# Patient Record
Sex: FEMALE | Race: WHITE | NOT HISPANIC OR LATINO | Employment: STUDENT | URBAN - METROPOLITAN AREA
[De-identification: names, ages, dates, MRNs, and addresses within clinical notes are randomized per-mention and may not be internally consistent; named-entity substitution may affect disease eponyms.]

---
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
## 2018-06-19 ENCOUNTER — TREATMENT (OUTPATIENT)
Dept: PHYSICAL THERAPY | Facility: CLINIC | Age: 18
End: 2018-06-19

## 2018-06-19 ENCOUNTER — TRANSCRIBE ORDERS (OUTPATIENT)
Dept: PHYSICAL THERAPY | Facility: CLINIC | Age: 18
End: 2018-06-19

## 2018-06-19 DIAGNOSIS — M85.552: Primary | ICD-10-CM

## 2018-06-19 PROCEDURE — 97161 PT EVAL LOW COMPLEX 20 MIN: CPT | Performed by: PHYSICAL THERAPIST

## 2018-06-19 PROCEDURE — 97110 THERAPEUTIC EXERCISES: CPT | Performed by: PHYSICAL THERAPIST

## 2018-06-19 PROCEDURE — 97140 MANUAL THERAPY 1/> REGIONS: CPT | Performed by: PHYSICAL THERAPIST

## 2018-06-19 NOTE — PROGRESS NOTES
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]  Referring practitioner: Em Cho MD  Date of Initial Visit: 2018  Today's Date: 2018  Patient seen for 1 sessions           Subjective Questionnaire: LEFS 29/80 (37%)    Subjective Evaluation    History of Present Illness  Mechanism of injury: Pt is a 17 y/oWF s/p removal of tumor  Tumor in the L hip jt and it was benign but needed removal and bone graft   Precautions NWB for 6-8 weeks progress 25# lbs   Return to MD  Some pain into the lower leg and it worsens througout the day  Reports low back pain a times during the day    Subjective comment: 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  Patient Occupation: student Quality of life: good    Pain  Current pain ratin  At best pain ratin  At worst pain ratin  Quality: dull ache and sharp  Relieving factors: change in position and support  Aggravating factors: prolonged positioning  Progression: improved    Social Support  Lives in: one-story house    Diagnostic Tests  X-ray: abnormal    Treatments  Current treatment: physical therapy  Patient Goals  Patient goals for therapy: decreased pain, improved balance, increased strength and increased motion  Patient goal: lifting weights, cardio           Objective       Observations   Left Hip  Positive for atrophy and edema.     Additional Observation Details  Quad, glute atrophy    Tenderness     Additional Tenderness Details  Tight IT band L with some referred knee pain   Moderate TTP at glute, TFL, hip adducors  And piriformis    Neurological Testing     Sensation     Hip   Left Hip   Intact: light touch    Right Hip   Intact: light touch    Additional Neurological Details  Reports some occ numbness and tingling down the leg    Active Range of Motion   Left Hip   Flexion: 76 degrees   Extension: 0 degrees    Abduction: 44 degrees   External rotation (90/90): 0 degrees   Internal rotation (90/90): 13 degrees     Right Hip   Normal active range of motion    Additional Active Range of Motion Details  Resting in 13 degrees IR at 90/90 position and grimaces in pain with ER  Lacking 25 degrees hip ext     Passive Range of Motion   Left Hip   Flexion: 80 degrees   Extension: 5 degrees   Abduction: 54 degrees   External rotation (90/90): 0 degrees   Internal rotation (90/90): 13 degrees     Joint Play   Left Hip   Hypomobile in the lateral hip capsule.    Strength/Myotome Testing     Left Hip   Planes of Motion   Flexion: 2+  Extension: 2  Abduction: 2  Adduction: 2+  External rotation: 2  Internal rotation: 2    Right Hip   Normal muscle strength    Tests     Additional Tests Details  No special tests performed due to pain    Ambulation     Observational Gait     Additional Observational Gait Details  NWB L LE with crutches, and safe use  Able to bear 25# today per MD and education on wt bearing but pt only able to place 10# on foot with knee flex in stance and cues needed to keep foot in contact with floor    General Comments     Hip Comments   Girth L thigh 36 cm (10cm above patella) and 47 cm (15 cm above patella)  R thigh 38.5cm and 51 cm           Assessment & Plan     Assessment  Impairments: abnormal gait, abnormal or restricted ROM, activity intolerance, impaired balance, impaired physical strength, lacks appropriate home exercise program, pain with function and weight-bearing intolerance  Assessment details: Pt presents s/p L hip surgery for removal of benign tumor and large incision with crutches and impaired gait.  She has a lot of pain,weakness and ROM loss in the L LE and the MD has ordered 25# of wt/week starting now.  Pt would benefit from skilled PT to address her deficits and help restore normal strength, ROM and function.   Prognosis: good  Functional Limitations: sleeping, walking, uncomfortable because of  pain, standing, stooping and unable to perform repetitive tasks  Goals  Plan Goals: STG ( in 2 weeks)  1) Pt will be able to complete HEP with I and no increase in pain above 3/10  2) Pt will demonstrate step through gait pattern with heel strike and foot flat following wt bearing precautions and crutches with no vc's  3) Pt will demonstrate full hip flex, ext and abd AROM to WNL for progression of functional strength    LTG (in 12 weeks)  1) Pt will demonstrate improved thigh girth to within 3 cm of R LE indicating improved strength  2) Pt will demonstrate good squat and lunge technique with I indicating improved functional strength and movement patterns  3) Pt will report pain less than 2/10 while working out and able to complete a full 30 min workout routine or class  4) Pt will demonstrate normal gait and stairs with reciprocal pattern I'ly      Plan  Therapy options: will be seen for skilled physical therapy services  Planned modality interventions: microcurrent electrical stimulation, thermotherapy (hydrocollator packs), ultrasound, high voltage pulsed current (spasm management), high voltage pulsed current (pain management) and electrical stimulation/Russian stimulation  Planned therapy interventions: manual therapy, neuromuscular re-education, soft tissue mobilization, strengthening, stretching, joint mobilization, home exercise program, gait training, flexibility, abdominal trunk stabilization, balance/weight-bearing training, body mechanics training and functional ROM exercises  Frequency: 2x week  Duration in weeks: 4  Treatment plan discussed with: patient        Manual Therapy:    10     mins  37164;  Therapeutic Exercise:    15     mins  82954;     Neuromuscular Prakash:    0    mins  71553;    Therapeutic Activity:     0     mins  04665;     Gait Training:      10     mins  34620;     Ultrasound:     0     mins  84721;    Electrical Stimulation:    0     mins  33875 ( );  Dry Needling     0      mins self-pay    Timed Treatment:   35   mins   Total Treatment:     49   mins    PT SIGNATURE: Casi Dorado, PT   DATE TREATMENT INITIATED: 6/20/2018    Initial Certification  Certification Period: 9/18/2018  I certify that the therapy services are furnished while this patient is under my care.  The services outlined above are required by this patient, and will be reviewed every 90 days.     PHYSICIAN: Em Cho MD      DATE:     Please sign and return via fax to [phone].. Thank you, [facility].

## 2018-06-28 ENCOUNTER — TREATMENT (OUTPATIENT)
Dept: PHYSICAL THERAPY | Facility: CLINIC | Age: 18
End: 2018-06-28

## 2018-06-28 DIAGNOSIS — M85.552: Primary | ICD-10-CM

## 2018-06-28 PROCEDURE — 97110 THERAPEUTIC EXERCISES: CPT | Performed by: PHYSICAL THERAPIST

## 2018-06-28 PROCEDURE — G0283 ELEC STIM OTHER THAN WOUND: HCPCS | Performed by: PHYSICAL THERAPIST

## 2018-06-28 PROCEDURE — 97140 MANUAL THERAPY 1/> REGIONS: CPT | Performed by: PHYSICAL THERAPIST

## 2018-06-28 NOTE — PROGRESS NOTES
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  Forgets to put weight on the leg    Objective   See Exercise, Manual, and Modality Logs for complete treatment. HEP handouts given for new ex  Assessment/Plan  Gait needs        Manual Therapy:    13     mins  67356;  Therapeutic Exercise:    27     mins  24743;     Neuromuscular Prakash:    0    mins  99288;    Therapeutic Activity:     0     mins  74949;     Gait Trainin     mins  32532;     Ultrasound:     0     mins  68791;    Electrical Stimulation:    15     mins  34866 ( );  Dry Needling     0     mins self-pay    Timed Treatment:   47   mins   Total Treatment:     63   mins    Casi Dorado, PT  Physical Therapist

## 2018-07-03 ENCOUNTER — TREATMENT (OUTPATIENT)
Dept: PHYSICAL THERAPY | Facility: CLINIC | Age: 18
End: 2018-07-03

## 2018-07-03 DIAGNOSIS — M85.552: Primary | ICD-10-CM

## 2018-07-03 PROCEDURE — 97140 MANUAL THERAPY 1/> REGIONS: CPT | Performed by: PHYSICAL THERAPIST

## 2018-07-03 PROCEDURE — 97110 THERAPEUTIC EXERCISES: CPT | Performed by: PHYSICAL THERAPIST

## 2018-07-03 PROCEDURE — G0283 ELEC STIM OTHER THAN WOUND: HCPCS | Performed by: PHYSICAL THERAPIST

## 2018-07-03 NOTE — PROGRESS NOTES
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 2/10 pain max. Pain 0/10 at times.     Objective   See Exercise, Manual, and Modality Logs for complete treatment. 50# wt bearing added today and pt tolerated well so her HEP will include amb with 50# L wt bearing.       Assessment/Plan  Gait pattern is much improved with near full knee ext in stance and increased stance phase.       Manual Therapy:    13     mins  40254;  Therapeutic Exercise:    28     mins  53086;     Neuromuscular Prakash:    0    mins  14325;    Therapeutic Activity:     0     mins  04545;     Gait Trainin     mins  83101;     Ultrasound:     0     mins  09672;    Electrical Stimulation:    15     mins  11166 ( );  Dry Needling     0     mins self-pay    Timed Treatment:   41   mins   Total Treatment:     60   mins    Casi Dorado PT  Physical Therapist

## 2018-07-05 ENCOUNTER — TREATMENT (OUTPATIENT)
Dept: PHYSICAL THERAPY | Facility: CLINIC | Age: 18
End: 2018-07-05

## 2018-07-05 DIAGNOSIS — M85.552: Primary | ICD-10-CM

## 2018-07-05 PROCEDURE — 97140 MANUAL THERAPY 1/> REGIONS: CPT | Performed by: PHYSICAL THERAPIST

## 2018-07-05 PROCEDURE — G0283 ELEC STIM OTHER THAN WOUND: HCPCS | Performed by: PHYSICAL THERAPIST

## 2018-07-05 PROCEDURE — 97110 THERAPEUTIC EXERCISES: CPT | Performed by: PHYSICAL THERAPIST

## 2018-07-05 NOTE — PROGRESS NOTES
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  Mostly just discomfort now.     Objective   See Exercise, Manual, and Modality Logs for complete treatment.       Assessment/Plan  Pt ambulation with crutches continues to improved and will progress POC for strength.  L hip ER is very limited so need MD clarification on how much to progress this.       Manual Therapy:    13     mins  03179;  Therapeutic Exercise:    29     mins  60688;     Neuromuscular Prakash:    0    mins  31227;    Therapeutic Activity:     0     mins  94247;     Gait Trainin     mins  18264;     Ultrasound:     0     mins  40932;    Electrical Stimulation:    15     mins  37470 ( );  Dry Needling     0     mins self-pay    Timed Treatment:   42   mins   Total Treatment:     62   mins    Casi Dorado, PT  Physical Therapist

## 2018-07-10 ENCOUNTER — TREATMENT (OUTPATIENT)
Dept: PHYSICAL THERAPY | Facility: CLINIC | Age: 18
End: 2018-07-10

## 2018-07-10 DIAGNOSIS — M85.552: Primary | ICD-10-CM

## 2018-07-10 PROCEDURE — 97116 GAIT TRAINING THERAPY: CPT | Performed by: PHYSICAL THERAPIST

## 2018-07-10 PROCEDURE — 97110 THERAPEUTIC EXERCISES: CPT | Performed by: PHYSICAL THERAPIST

## 2018-07-10 PROCEDURE — G0283 ELEC STIM OTHER THAN WOUND: HCPCS | Performed by: PHYSICAL THERAPIST

## 2018-07-10 PROCEDURE — 97140 MANUAL THERAPY 1/> REGIONS: CPT | Performed by: PHYSICAL THERAPIST

## 2018-07-10 NOTE — PROGRESS NOTES
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   Called surgeon again today to clarify MD orders and left VM. Decreased step length R due to hip abd weakness and hip flexor tightness L    Assessment/Plan  Pt progressing well with amb in parallel bars and following wt bearing. Thigh atrophy of quad and HS severe so increased standing activity will help hypertrophy of muscle.  Very weak in L hip abd  Standing hip flexor stretch since tightness observed during gait.    Manual Therapy:    10     mins  01750;  Therapeutic Exercise:    20     mins  19024;     Neuromuscular Prakash:    0    mins  49169;    Therapeutic Activity:     0     mins  79809;     Gait Trainin     mins  70008;     Ultrasound:     0     mins  86766;    Electrical Stimulation:    15     mins  74864 ( );  Dry Needling     0     mins self-pay    Timed Treatment:   43   mins   Total Treatment:     62   mins    Casi Dorado, PT  Physical Therapist

## 2018-07-12 ENCOUNTER — TREATMENT (OUTPATIENT)
Dept: PHYSICAL THERAPY | Facility: CLINIC | Age: 18
End: 2018-07-12

## 2018-07-12 PROCEDURE — 97116 GAIT TRAINING THERAPY: CPT | Performed by: PHYSICAL THERAPIST

## 2018-07-12 PROCEDURE — 97110 THERAPEUTIC EXERCISES: CPT | Performed by: PHYSICAL THERAPIST

## 2018-07-12 PROCEDURE — G0283 ELEC STIM OTHER THAN WOUND: HCPCS | Performed by: PHYSICAL THERAPIST

## 2018-07-12 PROCEDURE — 97140 MANUAL THERAPY 1/> REGIONS: CPT | Performed by: PHYSICAL THERAPIST

## 2018-07-12 NOTE — PROGRESS NOTES
Physical Therapy Daily Progress Note        Patient: Bruna Garcia   : 2000  Diagnosis/ICD-10 Code:  No primary diagnosis found.  Referring practitioner: Em Cho MD  Date of Initial Visit: No linked episodes  Today's Date: 2018  Patient seen for Visit count could not be calculated. Make sure you are using a visit which is associated with an episode. sessions             Subjective Pt states she has been practicing walking at home and it has been feeling good    Objective   See Exercise, Manual, and Modality Logs for complete treatment.       Assessment/Plan Pt hip external rotation still lacking so mobs with belt added today. Gait mechanics need a lot of cueing since pelvis retracting and very little quad control.    Manual Therapy:    17     mins  89897;  Therapeutic Exercise:    21     mins  32306;     Neuromuscular Prakash:    0    mins  80584;    Therapeutic Activity:     0     mins  13526;     Gait Trainin     mins  65651;     Ultrasound:     0     mins  24503;    Electrical Stimulation:    15     mins  07689 ( );  Dry Needling     0     mins self-pay    Timed Treatment:   49   mins   Total Treatment:     70   mins    Casi Dorado PT  Physical Therapist

## 2018-07-16 ENCOUNTER — OFFICE VISIT (OUTPATIENT)
Dept: OBSTETRICS AND GYNECOLOGY | Facility: CLINIC | Age: 18
End: 2018-07-16

## 2018-07-16 VITALS
BODY MASS INDEX: 19.12 KG/M2 | DIASTOLIC BLOOD PRESSURE: 66 MMHG | SYSTOLIC BLOOD PRESSURE: 100 MMHG | HEIGHT: 64 IN | WEIGHT: 112 LBS | HEART RATE: 72 BPM

## 2018-07-16 DIAGNOSIS — N76.0 ACUTE VAGINITIS: Primary | ICD-10-CM

## 2018-07-16 PROCEDURE — 99213 OFFICE O/P EST LOW 20 MIN: CPT | Performed by: OBSTETRICS & GYNECOLOGY

## 2018-07-16 RX ORDER — METRONIDAZOLE 500 MG/1
500 TABLET ORAL 2 TIMES DAILY
Qty: 14 TABLET | Refills: 0 | Status: SHIPPED | OUTPATIENT
Start: 2018-07-16 | End: 2018-07-23

## 2018-07-16 NOTE — PROGRESS NOTES
"Mati Garcia is a 18 y.o. female   Chief Complaint   Patient presents with   • Annual Exam     patient reports vaginal ordor, no vaginal itching or burning      History of Present Illness   Reports one week of vaginal odor.  Smells fishy.  Positive itching.  Not significant discharge.  Denies any fever or chills, no new sexual partners.  She has not been sexually active since her last visit us.  She underwent surgery in April on her left hip and was on some antibiotics around the time of that.    History reviewed. No pertinent past medical history.  Past Surgical History:   Procedure Laterality Date   • HIP SURGERY Left      Social History   Substance Use Topics   • Smoking status: Former Smoker     Types: Cigarettes   • Smokeless tobacco: Never Used   • Alcohol use Yes      Comment: social     Family History   Problem Relation Age of Onset   • No Known Problems Sister    • Breast cancer Maternal Grandmother    • Cancer Maternal Grandfather    • No Known Problems Sister    • No Known Problems Sister    • Ovarian cancer Neg Hx    • Uterine cancer Neg Hx    • Colon cancer Neg Hx          Review of Systems   Constitutional: Negative for activity change, appetite change, fatigue, fever and unexpected weight change.   Gastrointestinal: Negative for abdominal pain, nausea and vomiting.   Genitourinary: Positive for vaginal discharge. Negative for menstrual problem, vaginal bleeding and vaginal pain.   Hematological: Does not bruise/bleed easily.   Psychiatric/Behavioral: Negative for agitation.       Objective    /66   Pulse 72   Ht 162.6 cm (64.02\")   Wt 50.8 kg (112 lb)   LMP 07/12/2018 (Exact Date)   BMI 19.22 kg/m²    Physical Exam   Constitutional: She is oriented to person, place, and time. She appears well-developed and well-nourished.   HENT:   Head: Normocephalic and atraumatic.   Eyes: EOM are normal. No scleral icterus.   Neck: Normal range of motion.   Cardiovascular: Normal rate.  "   Pulmonary/Chest: Effort normal. No respiratory distress.   Abdominal: Soft.   Genitourinary: Uterus is not deviated, not enlarged and not tender. Cervix exhibits no motion tenderness. Right adnexum displays no mass, no tenderness and no fullness. Left adnexum displays no mass, no tenderness and no fullness. No bleeding in the vagina. No foreign body in the vagina. Vaginal discharge found.   Neurological: She is alert and oriented to person, place, and time.   Skin: Skin is warm and dry.   Psychiatric: She has a normal mood and affect. Her behavior is normal.   + odor  Wet prep: Clue cells present, no yeast or trichomonas      Assessment/Plan   Problems Addressed this Visit     None      Visit Diagnoses     Acute vaginitis    -  Primary        BV per wet prep and + odor  Will treat with Flagyl.  She has tried to Metrogel before and prefers pills. Side effects reviewed.

## 2018-07-17 ENCOUNTER — TREATMENT (OUTPATIENT)
Dept: PHYSICAL THERAPY | Facility: CLINIC | Age: 18
End: 2018-07-17

## 2018-07-17 PROCEDURE — 97110 THERAPEUTIC EXERCISES: CPT | Performed by: PHYSICAL THERAPIST

## 2018-07-17 PROCEDURE — 97140 MANUAL THERAPY 1/> REGIONS: CPT | Performed by: PHYSICAL THERAPIST

## 2018-07-17 PROCEDURE — G0283 ELEC STIM OTHER THAN WOUND: HCPCS | Performed by: PHYSICAL THERAPIST

## 2018-07-17 PROCEDURE — 97116 GAIT TRAINING THERAPY: CPT | Performed by: PHYSICAL THERAPIST

## 2018-07-17 NOTE — PROGRESS NOTES
Physical Therapy Daily Progress Note  Patient: Bruna Garcia   : 2000  Diagnosis/ICD-10 Code:  No primary diagnosis found.  Referring practitioner: Em Cho MD  Date of Initial Visit: Type: THERAPY  Noted: 2018  Today's Date: 2018  Patient seen for 6 sessions            Subjective Pt states she is now driving and feeling good    Objective   See Exercise, Manual, and Modality Logs for complete treatment.       Assessment/Plan Gait speed improved and pt activating quads during stance phase so wt shift in mirror with minimal UE support added today.  Tolerated HS with tband prone today.      Manual Therapy:   14      mins  55602;  Therapeutic Exercise:    20     mins  34983;     Neuromuscular Prakash:    0    mins  85569;    Therapeutic Activity:     0     mins  81538;     Gait Trainin     mins  76061;     Ultrasound:     0     mins  32507;    Electrical Stimulation:    15     mins  66912 ( );  Dry Needling     0     mins self-pay    Timed Treatment:   46   mins   Total Treatment:     64   mins    Casi Dorado, PT  Physical Therapist

## 2018-07-19 ENCOUNTER — TREATMENT (OUTPATIENT)
Dept: PHYSICAL THERAPY | Facility: CLINIC | Age: 18
End: 2018-07-19

## 2018-07-19 PROCEDURE — 97110 THERAPEUTIC EXERCISES: CPT | Performed by: PHYSICAL THERAPIST

## 2018-07-19 PROCEDURE — G0283 ELEC STIM OTHER THAN WOUND: HCPCS | Performed by: PHYSICAL THERAPIST

## 2018-07-19 PROCEDURE — 97140 MANUAL THERAPY 1/> REGIONS: CPT | Performed by: PHYSICAL THERAPIST

## 2018-07-19 NOTE — PROGRESS NOTES
Physical Therapy Daily Progress Note        Patient: Bruna Garcia   : 2000  Diagnosis/ICD-10 Code:  No primary diagnosis found.  Referring practitioner: Em Cho MD  Date of Initial Visit: Type: THERAPY  Noted: 2018  Today's Date: 2018  Patient seen for 7 sessions             Subjective Pt states she is feeling good no pain    Objective   See Exercise, Manual, and Modality Logs for complete treatment.       Assessment/Plan Pt tolerated wt bearing progression today to 90lbs using scale and tolerated more hip ER during stretches today.           Manual Therapy:   14      mins  37634;  Therapeutic Exercise:    40     mins  57472;     Neuromuscular Prakash:    0    mins  12629;    Therapeutic Activity:     0     mins  14924;     Gait Trainin     mins  40036;     Ultrasound:     0     mins  72006;    Electrical Stimulation:    15     mins  40761 ( );  Dry Needling     0     mins self-pay    Timed Treatment:   54   mins   Total Treatment:     70   mins    Casi Dorado, PT  Physical Therapist

## 2018-07-24 ENCOUNTER — TREATMENT (OUTPATIENT)
Dept: PHYSICAL THERAPY | Facility: CLINIC | Age: 18
End: 2018-07-24

## 2018-07-24 DIAGNOSIS — M85.552: Primary | ICD-10-CM

## 2018-07-24 PROCEDURE — 97140 MANUAL THERAPY 1/> REGIONS: CPT | Performed by: PHYSICAL THERAPIST

## 2018-07-24 PROCEDURE — G0283 ELEC STIM OTHER THAN WOUND: HCPCS | Performed by: PHYSICAL THERAPIST

## 2018-07-24 PROCEDURE — 97110 THERAPEUTIC EXERCISES: CPT | Performed by: PHYSICAL THERAPIST

## 2018-07-24 NOTE — PROGRESS NOTES
Physical Therapy Daily Progress Note      Patient: Bruna Garcia   : 2000  Diagnosis/ICD-10 Code:  No primary diagnosis found.  Referring practitioner: Em Cho MD  Date of Initial Visit: Type: THERAPY  Noted: 2018  Today's Date: 2018  Patient seen for 8 sessions           Subjective Pt reports she has been using one crutch most of the time    Objective   See Exercise, Manual, and Modality Logs for complete treatment.       Assessment/Plan  ER ROM is still bery limited and stretching causes pain so I will likely need to call MD to see how hard I can push her as stretches have been light due to hardware       Manual Therapy:    12     mins  11518;  Therapeutic Exercise:    29     mins  02835;     Neuromuscular Prakash:    0    mins  94335;    Therapeutic Activity:     0     mins  00780;     Gait Trainin     mins  74009;     Ultrasound:     0     mins  88595;    Electrical Stimulation:    15     mins  35697 ( );  Dry Needling     0     mins self-pay    Timed Treatment:   41   mins   Total Treatment:     62   mins    Casi Dorado PT  Physical Therapist

## 2018-07-26 ENCOUNTER — TREATMENT (OUTPATIENT)
Dept: PHYSICAL THERAPY | Facility: CLINIC | Age: 18
End: 2018-07-26

## 2018-07-26 DIAGNOSIS — M85.552: Primary | ICD-10-CM

## 2018-07-26 PROCEDURE — G0283 ELEC STIM OTHER THAN WOUND: HCPCS | Performed by: PHYSICAL THERAPIST

## 2018-07-26 PROCEDURE — 97140 MANUAL THERAPY 1/> REGIONS: CPT | Performed by: PHYSICAL THERAPIST

## 2018-07-26 PROCEDURE — 97110 THERAPEUTIC EXERCISES: CPT | Performed by: PHYSICAL THERAPIST

## 2018-07-26 NOTE — PROGRESS NOTES
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       Assessment/Plan Pt quad appears to be marybel in stance phase but hip is not stable enough to prevent hip IR in stance and step length short due to hip flexor tightness. No band with standing hip abd and ext today due to increased soreness    Manual Therapy:    23     mins  41168;  Therapeutic Exercise:    33     mins  03566;     Neuromuscular Prakash:    0    mins  95159;    Therapeutic Activity:     0     mins  39554;     Gait Trainin     mins  60162;     Ultrasound:     0     mins  32429;    Electrical Stimulation:    15     mins  22633 ( );  Dry Needling     0     mins self-pay    Timed Treatment:   56   mins   Total Treatment:     72   mins    Casi Dorado PT  Physical Therapist

## 2018-07-31 ENCOUNTER — TREATMENT (OUTPATIENT)
Dept: PHYSICAL THERAPY | Facility: CLINIC | Age: 18
End: 2018-07-31

## 2018-07-31 PROCEDURE — 97140 MANUAL THERAPY 1/> REGIONS: CPT | Performed by: PHYSICAL THERAPIST

## 2018-07-31 PROCEDURE — 97110 THERAPEUTIC EXERCISES: CPT | Performed by: PHYSICAL THERAPIST

## 2018-07-31 NOTE — PROGRESS NOTES
Physical Therapy Daily Progress Note        Patient: Bruna Garcia   : 2000  Diagnosis/ICD-10 Code:  No primary diagnosis found.  Referring practitioner: Em Cho MD  Date of Initial Visit: Type: THERAPY  Noted: 2018  Today's Date: 2018  Patient seen for 10 sessions           Subjective Pt reports knee felt better with tape and after last treatment.    Objective   See Exercise, Manual, and Modality Logs for complete treatment.       Assessment/Plan Gait trng without parallel bars and pelvic facilitation to improve hip abd activation and this improved gait quality.  Pre gait in mirror given as HEP to improved this.     Manual Therapy:    15     mins  35429;  Therapeutic Exercise:    39     mins  65210;     Neuromuscular Prakash:    0    mins  15808;    Therapeutic Activity:     0     mins  94693;     Gait Trainin     mins  00635;     Ultrasound:     0     mins  13198;    Electrical Stimulation:    0     mins  75914 ( );  Dry Needling     0     mins self-pay    Timed Treatment:   54   mins   Total Treatment:     58   mins    Casi Dorado PT  Physical Therapist

## 2018-08-06 ENCOUNTER — OFFICE VISIT (OUTPATIENT)
Dept: PHYSICAL THERAPY | Facility: CLINIC | Age: 18
End: 2018-08-06

## 2018-08-06 DIAGNOSIS — M85.552: Primary | ICD-10-CM

## 2018-08-06 PROCEDURE — 97032 APPL MODALITY 1+ESTIM EA 15: CPT | Performed by: PHYSICAL THERAPIST

## 2018-08-06 PROCEDURE — 97530 THERAPEUTIC ACTIVITIES: CPT | Performed by: PHYSICAL THERAPIST

## 2018-08-06 PROCEDURE — 97110 THERAPEUTIC EXERCISES: CPT | Performed by: PHYSICAL THERAPIST

## 2018-08-06 PROCEDURE — 97116 GAIT TRAINING THERAPY: CPT | Performed by: PHYSICAL THERAPIST

## 2018-08-08 ENCOUNTER — OFFICE VISIT (OUTPATIENT)
Dept: PHYSICAL THERAPY | Facility: CLINIC | Age: 18
End: 2018-08-08

## 2018-08-08 DIAGNOSIS — M85.552: Primary | ICD-10-CM

## 2018-08-08 PROCEDURE — 97032 APPL MODALITY 1+ESTIM EA 15: CPT | Performed by: PHYSICAL THERAPIST

## 2018-08-08 PROCEDURE — 97530 THERAPEUTIC ACTIVITIES: CPT | Performed by: PHYSICAL THERAPIST

## 2018-08-08 PROCEDURE — 97110 THERAPEUTIC EXERCISES: CPT | Performed by: PHYSICAL THERAPIST

## 2018-08-08 NOTE — PROGRESS NOTES
Physical Therapy Daily Progress Note    Time In 1600  Time Out 1718    Bruna Garcia reports: low back and right hip felt better from new stretches(LTR, skc, piriformis    Subjective     Objective   -amb in/out of clinic with one crutch with noted proper trunk posture.    See Exercise, Manual, and Modality Logs for complete treatment.   Added:  Treadmill fwd 3 min, 2 min each sidestep, 3 min retro  Cable walks 4 way x 5 reps with 5 lbs  Pallof press with red t band  Supine B LE hip abd/er with green band    Assessment/Plan  Able to increase exercise and functional activity without increased symptoms  Still with noted left hip, quad and glut fatigue with prolonged activity/prolonged duration of task.  Compliant/cooperative and motivated with rehab efforts.    Progress strengthening /stabilization /functional activity  L hip.             Manual Therapy:         mins  73884;  Therapeutic Exercise:    35     mins  06720;     Neuromuscular Prakash:        mins  57977;    Therapeutic Activity:     15     mins  38419;     Gait Training:           mins  79298;     Ultrasound:        mins  81273;    Electrical Stimulation:   15      mins  90740 ( );      Timed Treatment: 50   mins   Total Treatment:     78   mins    Carlos Duke, PTA    Physical Therapist Assistant [place] [facility]

## 2018-08-09 NOTE — PROGRESS NOTES
Physical Therapy Daily Progress Note    Time In  1500 Time Out 1630    Bruna Garcia reports: hip is feeling better.  Still feels weak but not as painful as before.  Noting some right sided low back discomfort.    Subjective     Objective     Ambulates with two crutches. Observed crutches to be to short causing slight fwd flexed posture which may be contributing to LBP.    See Exercise, Manual, and Modality Logs for complete treatment.     Exercise rationale/ pain free exercise performance  Anatomy and structure of affected musculature  Posture/Postural awareness with crutches  Sleeping positions with pillows  Alternate exercise positions  Verbal/Tactile cues to ensure correct exercise performance/technique    Added:4 way SLR L LE x 10 each; piriformis stretch B LE; SKTC B LE and LTR as well as standing hip marches B LE    Gait training x 10 min:  Adjusted crutch up 1 notch to facilitate more proper trunk posture/positioning.  Instructed and performed one crutch ambulation up and down 60 foot walking track x 4 times with FWB.  Ambulated up and down stair module with one crutch and one rail.        Assessment/Plan  Presents with improved left LE weightbearing capability and decreased reliance on assistive device as evidenced by capability to go to one crutch ambulation comfortably.  Still with evident weakness of left hip and quad musculature and overall decreased exercise and functional activity capability especially in regards to prolonged duration.    Progress strengthening /stabilization /functional activity for affected musculature           Manual Therapy:         mins  23464;  Therapeutic Exercise:  10     mins  24883;     Neuromuscular Prakash:        mins  71409;    Therapeutic Activity:    20      mins  78724;     Gait Training:   10      mins  10856;     Ultrasound:        mins  36763;    Electrical Stimulation:    15     mins  45549 ( );      Timed Treatment: 40   mins   Total Treatment:    90     kamini Duke, hospitals    Physical Therapist Assistant [place] [address]

## 2018-08-10 ENCOUNTER — OFFICE VISIT (OUTPATIENT)
Dept: PHYSICAL THERAPY | Facility: CLINIC | Age: 18
End: 2018-08-10

## 2018-08-10 DIAGNOSIS — M85.552: Primary | ICD-10-CM

## 2018-08-10 PROCEDURE — 97110 THERAPEUTIC EXERCISES: CPT | Performed by: PHYSICAL THERAPIST

## 2018-08-10 PROCEDURE — 97530 THERAPEUTIC ACTIVITIES: CPT | Performed by: PHYSICAL THERAPIST

## 2018-08-10 NOTE — PROGRESS NOTES
Physical Therapy Daily Progress Note    Time In 1400  Time [id number]    Bruna Garcia reports: hip did well with activity the other day, only soreness.  Going on vacation next week(week 8/13) for 10 days to beach    Subjective     Objective   See Exercise, Manual, and Modality Logs for complete treatment.   Added:  Mini band side steps, fwd steps x 10 each  4 inchFwd and lateral step ups fwd with knee drivers x 10 each  Low 5 inch hurdles and 10 inch hurdles x 10 each  Standing Toe tap on foam roller  X 10 each  sidelyng clams with blue t band  Wobble board fwd/bwd and side/side x 3 min each    Assessment/Plan  Continues to make good progress with exercise advancement and increased weightbearing balance and strengthening activities.  Ambulates on treadmill with good pace and foot placement.    Other  Reassess upon return from vacation.           Manual Therapy:         mins  23760;  Therapeutic Exercise:    27     mins  43381;     Neuromuscular Prakash:        mins  79119;    Therapeutic Activity:    18     mins  48570;     Gait Training:           mins  58092;     Ultrasound:        mins  88909;    Electrical Stimulation:         mins  22086 ( );      Timed Treatment: 45    mins   Total Treatment:     50   mins    Carlos Duke PTA    Physical Therapist Assistant [place] [address]

## 2018-08-21 ENCOUNTER — TREATMENT (OUTPATIENT)
Dept: PHYSICAL THERAPY | Facility: CLINIC | Age: 18
End: 2018-08-21

## 2018-08-21 DIAGNOSIS — M85.552: Primary | ICD-10-CM

## 2018-08-21 PROCEDURE — 97110 THERAPEUTIC EXERCISES: CPT | Performed by: PHYSICAL THERAPIST

## 2018-08-21 PROCEDURE — 97140 MANUAL THERAPY 1/> REGIONS: CPT | Performed by: PHYSICAL THERAPIST

## 2018-08-21 NOTE — PROGRESS NOTES
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    Objective   See Exercise, Manual, and Modality Logs for complete treatment.       Assessment/Plan  Single leg stance activities added today since L hip abductors weak as evidenced by lateral trunk lean in stance L.       Manual Therapy:    14     mins  47073;  Therapeutic Exercise:    39     mins  77199;     Neuromuscular Prakash:    0    mins  82806;    Therapeutic Activity:     0     mins  16156;     Gait Trainin     mins  69838;     Ultrasound:     0     mins  38524;    Electrical Stimulation:    0     mins  48233 ( );  Dry Needling     0     mins self-pay    Timed Treatment:   53   mins   Total Treatment:     54   mins    Casi Dorado PT  Physical Therapist

## 2018-08-23 ENCOUNTER — TREATMENT (OUTPATIENT)
Dept: PHYSICAL THERAPY | Facility: CLINIC | Age: 18
End: 2018-08-23

## 2018-08-23 PROCEDURE — 97140 MANUAL THERAPY 1/> REGIONS: CPT | Performed by: PHYSICAL THERAPIST

## 2018-08-23 PROCEDURE — 97110 THERAPEUTIC EXERCISES: CPT | Performed by: PHYSICAL THERAPIST

## 2018-08-23 NOTE — PROGRESS NOTES
Physical Therapy Daily Progress Note        Patient: Bruna Garcia   : 2000  Diagnosis/ICD-10 Code:  No primary diagnosis found.  Referring practitioner: Em Cho MD  Date of Initial Visit: Type: THERAPY  Noted: 2018  Today's Date: 2018  Patient seen for 15 sessions               Subjective Pt reports her soreness is gone and no pain today.     Objective   See Exercise, Manual, and Modality Logs for complete treatment. L hip ER at 90 degrees hip flex measured at 35 degrees today.      Assessment/Plan  L hip ER improved but ROM, pelvic mobility and hip abd/glute strength still lacking so giat abnormal .  Continued PT recommended              Manual Therapy:    12     mins  42998;  Therapeutic Exercise:    32     mins  90192;     Neuromuscular Prakahs:    0    mins  93552;    Therapeutic Activity:     0     mins  85827;     Gait Trainin     mins  32816;     Ultrasound:     0     mins  32597;    Electrical Stimulation:    0     mins  50350 ( );  Dry Needling     0     mins self-pay    Timed Treatment:   44   mins   Total Treatment:     48   mins    Casi Dorado PT  Physical Therapist

## 2018-08-27 ENCOUNTER — TREATMENT (OUTPATIENT)
Dept: PHYSICAL THERAPY | Facility: CLINIC | Age: 18
End: 2018-08-27

## 2018-08-27 DIAGNOSIS — M85.552: Primary | ICD-10-CM

## 2018-08-27 PROCEDURE — G0283 ELEC STIM OTHER THAN WOUND: HCPCS | Performed by: PHYSICAL THERAPIST

## 2018-08-27 PROCEDURE — 97140 MANUAL THERAPY 1/> REGIONS: CPT | Performed by: PHYSICAL THERAPIST

## 2018-08-27 PROCEDURE — 97110 THERAPEUTIC EXERCISES: CPT | Performed by: PHYSICAL THERAPIST

## 2018-08-27 NOTE — PROGRESS NOTES
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    Objective   See Exercise, Manual, and Modality Logs for complete treatment. ER at 90 45 degrees.      Assessment/Plan   Pt progressing well and ER at 90 degrees improved by 10 degrees.  Gait deficits with glute weakness and hip ER limited.        Manual Therapy:    15     mins  64364;  Therapeutic Exercise:    38     mins  39717;     Neuromuscular Prakash:    0    mins  18480;    Therapeutic Activity:     0     mins  13043;     Gait Trainin     mins  43775;     Ultrasound:     0     mins  84760;    Electrical Stimulation:    15     mins  16764 ( );  Dry Needling     0     mins self-pay    Timed Treatment:   53   mins   Total Treatment:     75   mins    Casi Dorado, PT  Physical Therapist

## 2018-09-12 ENCOUNTER — TREATMENT (OUTPATIENT)
Dept: PHYSICAL THERAPY | Facility: CLINIC | Age: 18
End: 2018-09-12

## 2018-09-12 DIAGNOSIS — M85.552: Primary | ICD-10-CM

## 2018-09-12 PROCEDURE — 97110 THERAPEUTIC EXERCISES: CPT | Performed by: PHYSICAL THERAPIST

## 2018-09-12 PROCEDURE — 97140 MANUAL THERAPY 1/> REGIONS: CPT | Performed by: PHYSICAL THERAPIST

## 2018-09-12 PROCEDURE — 97530 THERAPEUTIC ACTIVITIES: CPT | Performed by: PHYSICAL THERAPIST

## 2018-09-12 PROCEDURE — 97116 GAIT TRAINING THERAPY: CPT | Performed by: PHYSICAL THERAPIST

## 2018-09-12 NOTE — PROGRESS NOTES
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  She has been having back pain and its a constant pain.    Objective   See Exercise, Manual, and Modality Logs for complete treatment. Palpation revealed L rotation at T12-L2 and guarding at R paraspinals.  She has moderate tenderness at this area as well as a low PSIS and high ASIS on the L.  Muscle energy performed at L innominate to rotate anteriorly. L hip ER with hip flexed at 90 degrees 46 degrees. L hip ext strength 4/5 and abd, IR and ER strength 4-/5.  Gait presents with R trunk shift with L lateral trunk lean in stance L, hip retraction in stance L and genurecurvatum. L hip capsule hypomobile laterally, ant and post.    Assessment/Plan  Muscle energy did help to level the pelvis and rotational mob at L2 produced cavitation.  Gait deficits indicate hip and knee strength that is not adequate to allow normal gait and her hip strength and ROM both play into this. Mobs for ant hip will be added and ER ROM will need to be progressed as well and closed chain ex to help her return to normal gait.        Manual Therapy:    16     mins  78833;  Therapeutic Exercise:    13     mins  65489;     Neuromuscular Prakash:    0    mins  85488;    Therapeutic Activity:     13     mins  88487;     Gait Trainin     mins  94010;     Ultrasound:     0     mins  64361;    Electrical Stimulation:    0     mins  71560 ( );  Dry Needling     0     mins self-pay    Timed Treatment:   51   mins   Total Treatment:     51   mins    Casi Dorado, PT  Physical Therapist

## 2018-09-14 ENCOUNTER — TREATMENT (OUTPATIENT)
Dept: PHYSICAL THERAPY | Facility: CLINIC | Age: 18
End: 2018-09-14

## 2018-09-14 PROCEDURE — 97530 THERAPEUTIC ACTIVITIES: CPT | Performed by: PHYSICAL THERAPIST

## 2018-09-14 PROCEDURE — 97140 MANUAL THERAPY 1/> REGIONS: CPT | Performed by: PHYSICAL THERAPIST

## 2018-09-14 PROCEDURE — 97110 THERAPEUTIC EXERCISES: CPT | Performed by: PHYSICAL THERAPIST

## 2018-09-14 PROCEDURE — G0283 ELEC STIM OTHER THAN WOUND: HCPCS | Performed by: PHYSICAL THERAPIST

## 2018-09-14 NOTE — PROGRESS NOTES
Physical Therapy Daily Progress Note        Patient: Bruna Garcia   : 2000  Diagnosis/ICD-10 Code:  No primary diagnosis found.  Referring practitioner: Van Gonzales*  Date of Initial Visit: Type: THERAPY  Noted: 2018  Today's Date: 2018  Patient seen for 18 sessions             Subjective Pt reports back pain continues and was severe yesterday morning.      Objective   See Exercise, Manual, and Modality Logs for complete treatment. Review of crutch walking with reciprocal four point gait.        Assessment/Plan New ex and mobs added for L hip flexibility and strength. Gait improved with pre gait as manual facilitation at pelvis and trunk added to re-educate her pattern.        Manual Therapy:    18     mins  81667;  Therapeutic Exercise:    12     mins  47082;     Neuromuscular Prakash:    0    mins  24423;    Therapeutic Activity:     15     mins  52774;     Gait Trainin     mins  98005;     Ultrasound:     0     mins  90721;    Electrical Stimulation:    15     mins  44545 ( );  Dry Needling     0     mins self-pay    Timed Treatment:   45   mins   Total Treatment:     60   mins    Casi Dorado, PT  Physical Therapist

## 2018-09-17 ENCOUNTER — TREATMENT (OUTPATIENT)
Dept: PHYSICAL THERAPY | Facility: CLINIC | Age: 18
End: 2018-09-17

## 2018-09-17 DIAGNOSIS — M85.552: Primary | ICD-10-CM

## 2018-09-17 PROCEDURE — PTSPMIN1 PR PHYS THER SP UP TO 15 MINUTES: Performed by: PHYSICAL THERAPIST

## 2018-09-17 NOTE — PROGRESS NOTES
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      Objective   See Exercise, Manual, and Modality Logs for complete treatment.       Assessment/Plan Hip mobs and gait trng emphasized today and pt needed review of new exercises.  Pt continues to not attain quad and glute contraction in standing on L unless cued.  Gait deficits still significant.      Manual Therapy:    15     mins  21191;  Therapeutic Exercise:    0   mins  84153;     Neuromuscular Prakash:    0    mins  95049;    Therapeutic Activity:     15     mins  96050;     Gait Trainin     mins  69464;     Ultrasound:     0     mins  79718;    Electrical Stimulation:    0     mins  98776 ( );  Dry Needling     0     mins self-pay    Timed Treatment:   35   mins   Total Treatment:     35   mins    Casi Dorado, PT  Physical Therapist

## 2018-09-24 ENCOUNTER — TREATMENT (OUTPATIENT)
Dept: PHYSICAL THERAPY | Facility: CLINIC | Age: 18
End: 2018-09-24

## 2018-09-24 DIAGNOSIS — M85.552: Primary | ICD-10-CM

## 2018-09-24 PROCEDURE — PTSPMIN2 PR PHYS THER SP 16 TO 30 MINUTES: Performed by: PHYSICAL THERAPIST

## 2018-09-24 NOTE — PROGRESS NOTES
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       Assessment/Plan Pt still leaning to L with trunk and hip flex and ER limited along with strength in these muscles so emphasis on hip mobs today and progression of hip abd strength to use band in sidelying plank clamshells and lunges.        Manual Therapy:    27     mins  03835;  Therapeutic Exercise:    0     mins  45414;     Neuromuscular Prakash:    0    mins  84654;    Therapeutic Activity:     0     mins  70217;     Gait Training:      15     mins  14948;     Ultrasound:     0     mins  68068;    Electrical Stimulation:    0     mins  32435 ( );  Dry Needling     0     mins self-pay    Timed Treatment:   32   mins   Total Treatment:     32   mins    Casi Dorado, PT  Physical Therapist

## 2018-09-29 ENCOUNTER — APPOINTMENT (OUTPATIENT)
Dept: GENERAL RADIOLOGY | Facility: HOSPITAL | Age: 18
End: 2018-09-29

## 2018-09-29 PROCEDURE — 73560 X-RAY EXAM OF KNEE 1 OR 2: CPT | Performed by: GENERAL PRACTICE

## 2018-10-11 ENCOUNTER — OFFICE VISIT (OUTPATIENT)
Dept: OBSTETRICS AND GYNECOLOGY | Facility: CLINIC | Age: 18
End: 2018-10-11

## 2018-10-11 VITALS
BODY MASS INDEX: 21.26 KG/M2 | SYSTOLIC BLOOD PRESSURE: 94 MMHG | HEART RATE: 98 BPM | HEIGHT: 63 IN | DIASTOLIC BLOOD PRESSURE: 54 MMHG | WEIGHT: 120 LBS

## 2018-10-11 DIAGNOSIS — N76.1 SUBACUTE VAGINITIS: Primary | ICD-10-CM

## 2018-10-11 PROCEDURE — 99213 OFFICE O/P EST LOW 20 MIN: CPT | Performed by: OBSTETRICS & GYNECOLOGY

## 2018-10-11 NOTE — PROGRESS NOTES
"Mati Garcia is a 18 y.o. female   Chief Complaint   Patient presents with   • Vaginal Discharge     patient reports reports odor and discharge (clear color). Patient reports she believes she has BV       History of Present Illness  Reports that after her last BV treatment, the spell and away.  Now she is having a return of the odor and some discharge.  Mild itching.  Denies any pain.  Denies any fever or chills.  No past medical history on file.  Past Surgical History:   Procedure Laterality Date   • HIP SURGERY Left      Social History   Substance Use Topics   • Smoking status: Former Smoker     Types: Cigarettes   • Smokeless tobacco: Never Used   • Alcohol use Yes      Comment: social     Family History   Problem Relation Age of Onset   • No Known Problems Sister    • Breast cancer Maternal Grandmother    • Cancer Maternal Grandfather    • No Known Problems Sister    • No Known Problems Sister    • Ovarian cancer Neg Hx    • Uterine cancer Neg Hx    • Colon cancer Neg Hx        Review of Systems   Constitutional: Negative for activity change, appetite change, fatigue, fever and unexpected weight change.   Gastrointestinal: Negative for abdominal pain, nausea and vomiting.   Genitourinary: Positive for vaginal discharge. Negative for menstrual problem, vaginal bleeding and vaginal pain.   Hematological: Does not bruise/bleed easily.   Psychiatric/Behavioral: Negative for agitation.       Objective    BP 94/54   Pulse 98   Ht 160 cm (62.99\")   Wt 54.4 kg (120 lb)   LMP 09/22/2018   BMI 21.26 kg/m²    Physical Exam   Constitutional: She is oriented to person, place, and time. She appears well-developed and well-nourished.   HENT:   Head: Normocephalic and atraumatic.   Eyes: EOM are normal. No scleral icterus.   Neck: Normal range of motion.   Cardiovascular: Normal rate.    Pulmonary/Chest: Effort normal. No respiratory distress.   Abdominal: Soft.   Genitourinary: Uterus normal. Vaginal " discharge found.   Neurological: She is alert and oriented to person, place, and time.   Skin: Skin is warm and dry.   Psychiatric: She has a normal mood and affect. Her behavior is normal.         Assessment/Plan   Bruna was seen today for vaginal discharge.    Diagnoses and all orders for this visit:    Subacute vaginitis  -     NuSwab VG+ - Swab, Vagina        Will treat based on test.  Reviewed test is negative, can consider vaginal products such as RepHresh to see if odor improves.  If BV recurrent, consider initial Rx with PO medication followed by Metrogel suppression

## 2018-10-17 LAB
A VAGINAE DNA VAG QL NAA+PROBE: NORMAL SCORE
BVAB2 DNA VAG QL NAA+PROBE: NORMAL SCORE
C ALBICANS DNA VAG QL NAA+PROBE: NEGATIVE
C GLABRATA DNA VAG QL NAA+PROBE: NEGATIVE
C TRACH RRNA SPEC QL NAA+PROBE: NEGATIVE
MEGA1 DNA VAG QL NAA+PROBE: NORMAL SCORE
N GONORRHOEA RRNA SPEC QL NAA+PROBE: NEGATIVE
T VAGINALIS RRNA SPEC QL NAA+PROBE: NEGATIVE

## 2018-10-19 ENCOUNTER — TELEPHONE (OUTPATIENT)
Dept: OBSTETRICS AND GYNECOLOGY | Facility: CLINIC | Age: 18
End: 2018-10-19

## 2018-10-19 NOTE — TELEPHONE ENCOUNTER
10/19/18  Called patient to inform results, no answer. Left a message to return call.      ----- Message from Monika Anton MD sent at 10/18/2018  4:11 PM EDT -----  Please let patient know that her vaginal swab was negative.  She can try the RepHresh product to see if symptoms improve. Thanks!

## 2018-10-29 ENCOUNTER — TREATMENT (OUTPATIENT)
Dept: PHYSICAL THERAPY | Facility: CLINIC | Age: 18
End: 2018-10-29

## 2018-10-29 PROCEDURE — PTSPMIN1 PR PHYS THER SP UP TO 15 MINUTES: Performed by: PHYSICAL THERAPIST

## 2018-10-29 NOTE — PROGRESS NOTES
Re-Assessment / Re-Certification        Patient: Bruna Garcia   : 2000  Diagnosis/ICD-10 Code:  No primary diagnosis found.  Referring practitioner: No ref. provider found  Date of Initial Visit: No linked episodes  Today's Date: 10/29/2018  Patient seen for Visit count could not be calculated. Make sure you are using a visit which is associated with an episode. sessions      Subjective:     Clinical Progress: worse  Home Program Compliance: Yes  Treatment has included: therapeutic exercise, neuromuscular re-education, manual therapy, therapeutic activity, gait training and electrical stimulation    Subjective Pt reports she had a fall and I advised her to see an ortho MD.  They found a tibial plateau fracture on MRI and MD stated it is healing but PT should not treat the knee, just the hip.  She has developed severe back pain as well due to the limping.     Objective   L hip ROM  ER at 90 degrees 25 degrees  Abd, flex, ext WNL    L knee NT    L hip strength   Abd 3+/5  Ext 4-/5  Flex 4-/5    Gait   L hip Trendelenberg and genu recurvatum with L illiac crest elevation in stance L    Palpation   Moderate tenderness at the L lumbar parspinals, SI jt and piriformis, glute med  L ASIS lisa, PSIS low    Assessment/Plan L post innominate rotation and R rotated L3-4, 4-5 and pt continues to have abnormal gait due to hip weakness and the limp from hip weakness and knee pain has also caused back pain.     Plan Goals: STG ( in 2 weeks)  1) Pt will be able to complete HEP with I and no increase in pain   2) Pt will demonstrate step through gait pattern with heel strike and foot flat with no Trendelenberg to reduce strain on the low back/SI  3) Pt will demonstrate L hip abd, flex, ext strength to 4/5 to allow improved control during gait and t/f    LTG (in 8 weeks)  1) Pt will demonstrate improved thigh girth to within 3 cm of R LE indicating improved strength  2) Pt will demonstrate good squat and lunge technique  with I indicating improved functional strength and movement patterns  3) Pt will report pain less than 2/10 while working out and able to complete a full 30 min workout routine or class  4) Pt will demonstrate normal gait and stairs with reciprocal pattern I'ly         Recommendations: Continue as planned  Timeframe: 1 month  Prognosis to achieve goals: good    PT Signature: Casi Dorado, PT      Based upon review of the patient's progress and continued therapy plan, it is my medical opinion that Bruna Garcia should continue physical therapy treatment at [facility]  [address] [place] [id number] [phone].    Signature: __________________________________      Manual Therapy:    16     mins  21706;  Therapeutic Exercise:    17     mins  39356;     Neuromuscular Prakash:    0    mins  13592;    Therapeutic Activity:     0     mins  72641;     Gait Trainin     mins  63322;     Ultrasound:     0     mins  39219;    Electrical Stimulation:    15     mins  52993 ( );  Dry Needling     0     mins self-pay    Timed Treatment:   33   mins   Total Treatment:     48   mins

## 2018-11-01 ENCOUNTER — TREATMENT (OUTPATIENT)
Dept: PHYSICAL THERAPY | Facility: CLINIC | Age: 18
End: 2018-11-01

## 2018-11-01 PROCEDURE — PTSPVT PR CUSTOM PT TREATMENT OF SELF PAY PATIENT: Performed by: PHYSICAL THERAPIST

## 2018-11-01 NOTE — PROGRESS NOTES
Physical Therapy Daily Progress Note    Patient: Bruna Garcia   : 2000  Diagnosis/ICD-10 Code:  No primary diagnosis found.  Referring practitioner: No ref. provider found  Date of Initial Visit: Type: THERAPY  Noted: 2018  Today's Date: 2018  Patient seen for 22 sessions             Subjective Pt states he back felt better after last treatment.     Objective   See Exercise, Manual, and Modality Logs for complete treatment.       Assessment/Plan Treatment emphasized manual for hip and low back today and will do this next session since she cannot work her L knee until seeing her MD again at end of the month.        Manual Therapy:    16     mins  15843;  Therapeutic Exercise:    13     mins  99309;     Neuromuscular Prakash:    0    mins  71326;    Therapeutic Activity:     0     mins  01737;     Gait Trainin     mins  50282;     Ultrasound:     0     mins  12007;    Electrical Stimulation:    0     mins  52753 ( );  Dry Needling     0     mins self-pay    Timed Treatment:   29   mins   Total Treatment:     29   mins    Casi Dorado PT  Physical Therapist

## 2018-11-08 ENCOUNTER — TREATMENT (OUTPATIENT)
Dept: PHYSICAL THERAPY | Facility: CLINIC | Age: 18
End: 2018-11-08

## 2018-11-08 DIAGNOSIS — M85.552: Primary | ICD-10-CM

## 2018-11-08 PROCEDURE — PTSPMIN2 PR PHYS THER SP 16 TO 30 MINUTES: Performed by: PHYSICAL THERAPIST

## 2018-11-08 NOTE — PROGRESS NOTES
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 provider found  Date of Initial Visit: Type: THERAPY  Noted: 2018  Today's Date: 2018  Patient seen for 23 sessions             Subjective Pt states her knee has been having no pain and her back has felt a lot better since I started working on it.     Objective   See Exercise, Manual, and Modality Logs for complete treatment.       Assessment/Plan  Pt is ok with waiting to return until after MD appt with work on HEP until then as she cannot be progressed with her L hip until allowed to use the L knee fully.      Manual Therapy:    14     mins  11569;  Therapeutic Exercise:    14     mins  59098;     Neuromuscular Prakash:    0    mins  23734;    Therapeutic Activity:     0     mins  28978;     Gait Trainin     mins  59915;     Ultrasound:     0     mins  59435;    Electrical Stimulation:    0     mins  31793 ( );  Dry Needling     0     mins self-pay    Timed Treatment:   28   mins   Total Treatment:     28   mins    Casi Dorado PT  Physical Therapist

## 2018-11-21 ENCOUNTER — APPOINTMENT (OUTPATIENT)
Dept: GENERAL RADIOLOGY | Facility: HOSPITAL | Age: 18
End: 2018-11-21

## 2018-11-21 ENCOUNTER — LAB (OUTPATIENT)
Dept: LAB | Facility: HOSPITAL | Age: 18
End: 2018-11-21

## 2018-11-21 ENCOUNTER — TRANSCRIBE ORDERS (OUTPATIENT)
Dept: ADMINISTRATIVE | Facility: HOSPITAL | Age: 18
End: 2018-11-21

## 2018-11-21 DIAGNOSIS — R53.1 WEAK: ICD-10-CM

## 2018-11-21 DIAGNOSIS — R53.1 WEAK: Primary | ICD-10-CM

## 2018-11-21 DIAGNOSIS — R11.10 VOMITING, INTRACTABILITY OF VOMITING NOT SPECIFIED, PRESENCE OF NAUSEA NOT SPECIFIED, UNSPECIFIED VOMITING TYPE: ICD-10-CM

## 2018-11-21 LAB
ALBUMIN SERPL-MCNC: 4.1 G/DL (ref 3.5–5.2)
ALBUMIN/GLOB SERPL: 1.1 G/DL
ALP SERPL-CCNC: 59 U/L (ref 39–117)
ALT SERPL W P-5'-P-CCNC: 8 U/L (ref 1–33)
ANION GAP SERPL CALCULATED.3IONS-SCNC: 12.8 MMOL/L
AST SERPL-CCNC: 13 U/L (ref 1–32)
BILIRUB SERPL-MCNC: 0.3 MG/DL (ref 0.1–1.2)
BUN BLD-MCNC: 10 MG/DL (ref 6–20)
BUN/CREAT SERPL: 16.1 (ref 7–25)
CALCIUM SPEC-SCNC: 9.2 MG/DL (ref 8.6–10.5)
CHLORIDE SERPL-SCNC: 103 MMOL/L (ref 98–107)
CO2 SERPL-SCNC: 24.2 MMOL/L (ref 22–29)
CREAT BLD-MCNC: 0.62 MG/DL (ref 0.57–1)
DEPRECATED RDW RBC AUTO: 42.4 FL (ref 37–54)
ERYTHROCYTE [DISTWIDTH] IN BLOOD BY AUTOMATED COUNT: 13.5 % (ref 11.7–13)
GFR SERPL CREATININE-BSD FRML MDRD: 123 ML/MIN/1.73
GLOBULIN UR ELPH-MCNC: 3.6 GM/DL
GLUCOSE BLD-MCNC: 97 MG/DL (ref 65–99)
HCT VFR BLD AUTO: 42.6 % (ref 35.6–45.5)
HGB BLD-MCNC: 14.1 G/DL (ref 11.9–15.5)
LIPASE SERPL-CCNC: 23 U/L (ref 13–60)
MCH RBC QN AUTO: 28.4 PG (ref 26.9–32)
MCHC RBC AUTO-ENTMCNC: 33.1 G/DL (ref 32.4–36.3)
MCV RBC AUTO: 85.9 FL (ref 80.5–98.2)
PLATELET # BLD AUTO: 283 10*3/MM3 (ref 140–500)
PMV BLD AUTO: 10.5 FL (ref 6–12)
POTASSIUM BLD-SCNC: 3.7 MMOL/L (ref 3.5–5.2)
PROT SERPL-MCNC: 7.7 G/DL (ref 6–8.5)
RBC # BLD AUTO: 4.96 10*6/MM3 (ref 3.9–5.2)
SODIUM BLD-SCNC: 140 MMOL/L (ref 136–145)
WBC NRBC COR # BLD: 5.24 10*3/MM3 (ref 4.5–10.7)

## 2018-11-21 PROCEDURE — 83690 ASSAY OF LIPASE: CPT

## 2018-11-21 PROCEDURE — 36415 COLL VENOUS BLD VENIPUNCTURE: CPT

## 2018-11-21 PROCEDURE — 85027 COMPLETE CBC AUTOMATED: CPT

## 2018-11-21 PROCEDURE — 80053 COMPREHEN METABOLIC PANEL: CPT

## 2018-11-21 PROCEDURE — 71046 X-RAY EXAM CHEST 2 VIEWS: CPT | Performed by: GENERAL PRACTICE

## 2018-12-07 ENCOUNTER — TREATMENT (OUTPATIENT)
Dept: PHYSICAL THERAPY | Facility: CLINIC | Age: 18
End: 2018-12-07

## 2018-12-07 DIAGNOSIS — M85.552: Primary | ICD-10-CM

## 2018-12-07 PROCEDURE — PTSPMIN2 PR PHYS THER SP 16 TO 30 MINUTES: Performed by: PHYSICAL THERAPIST

## 2018-12-07 NOTE — PROGRESS NOTES
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 provider found  Date of Initial Visit: Type: THERAPY  Noted: 2018  Today's Date: 2018  Patient seen for 24 sessions      Subjective:   Subjective Questionnaire:   Clinical Progress: improved  Home Program Compliance: Yes  Treatment has included: therapeutic exercise, neuromuscular re-education, manual therapy, therapeutic activity, gait training and electrical stimulation    Subjective Pt states she still has pain especially in the low back.  L hip pain 3/10 with movement , back pain 5/10 on average and 7/10 at times  Objective    L glute atrophy     Gait - antalgic with L trendelenberg and hip retraction with occasional genurecurvatum L     L hip AROM  IR 21  ER 34    PROM   IR 30  ER 41    R hip AROM  IR 40  ER 42    PROM  IR 49  ER 51    Strength   L hip abd 3/5  Hip ext 3+/5  Hip flex 3+/5  Knee flex/ext 4/5  Ankle DF/PF4+/5    Thigh girth L thigh 36 cm (10cm above patella) and 48 cm (15 cm above patella)  R thigh 39cm and 51 cm       Palpation   Moderate tenderness at the L lumbar parspinals, SI jt and piriformis, glute med  L ASIS lisa, PSIS low     Assessment/Plan L post innominate rotation and R rotated L3-4, 4-5 and pt continues to have abnormal gait due to hip weakness and the limp from hip weakness and knee pain has also caused back pain.     Assessment/Plan Pt has had slow progress since she fractured her knee but even prior to the fracture she had great difficulty with L hip IR/ER, abd and ext activation and ROM. She could not tolerate aggressive stretching and mobilizations so graded 2/3 mobs performed at neutral and stretches to tolerance but her L hip flexion and ER have not improved in weeks.  I am concerned about her gait deficits and the patient has a lot of back pain due to her antalgic gait.  Pt  plans to ask a lot of questions at her next appt with ortho MD who performed the surgery because she feels like she doesn't understand why she can't activate her L hip despite doing the exercises at home.     1) Pt will be able to complete HEP with I and no increase in pain MET  2) Pt will demonstrate step through gait pattern with heel strike and foot flat with no Trendelenberg to reduce strain on the low back/SI NOT MET  3) Pt will demonstrate L hip abd, flex, ext strength to 4/5 to allow improved control during gait and t/f NOT MET    LTG (in 8 weeks)  1) Pt will demonstrate improved thigh girth to within 3 cm of R LE indicating improved strength NOT MET  2) Pt will demonstrate good squat and lunge technique with I indicating improved functional strength and movement patterns PROGRESSING  3) Pt will report pain less than 2/10 while working out and able to complete a full 30 min workout routine or class   4) Pt will demonstrate normal gait and stairs with reciprocal pattern I'ly NOT MET     Progress toward previous goals: Not Met    Recommendations: Continue as planned  Timeframe: 1 month  Prognosis to achieve goals: fair/good    PT Signature: Casi Dorado, RUBEN      Based upon review of the patient's progress and continued therapy plan, it is my medical opinion that Bruna Garcia should continue physical therapy treatment at [facility]  [address] [place] [id number] [phone].    Signature: __________________________________      Manual Therapy:    13    mins  79234;  Therapeutic Exercise:    19     mins  65414;     Neuromuscular Prakash:    0    mins  55121;    Therapeutic Activity:     0     mins  55388;     Gait Trainin     mins  70690;     Ultrasound:     0     mins  90229;    Electrical Stimulation:    0     mins  55207 ( );  Dry Needling     0     mins self-pay    Timed Treatment:   32   mins   Total Treatment:     32    mins

## 2018-12-10 ENCOUNTER — TREATMENT (OUTPATIENT)
Dept: PHYSICAL THERAPY | Facility: CLINIC | Age: 18
End: 2018-12-10

## 2018-12-10 PROCEDURE — PTSPMIN2 PR PHYS THER SP 16 TO 30 MINUTES: Performed by: PHYSICAL THERAPIST

## 2018-12-10 NOTE — PROGRESS NOTES
Physical Therapy Daily Progress Note        Patient: Bruna Garcia   : 2000  Diagnosis/ICD-10 Code:  No primary diagnosis found.  Referring practitioner: No ref. provider found  Date of Initial Visit: Type: THERAPY  Noted: 2018  Today's Date: 12/10/2018  Patient seen for 25 sessions         Subjective Pt states her MD said it would take a year or more  But she should be able to get back to normal walking and even running.    Objective   See Exercise, Manual, and Modality Logs for complete treatment.       Assessment/Plan Pt tolerated new ex well and HEP given so she can perform at the gym to avoid coming twice a week due to her schedule.            Manual Therapy:    17     mins  67512;  Therapeutic Exercise:    0     mins  40182;     Neuromuscular Prakash:    0    mins  74275;    Therapeutic Activity:     17     mins  96995;     Gait Trainin     mins  77812;     Ultrasound:     0     mins  19520;    Electrical Stimulation:    0     mins  75625 ( );  Dry Needling     0     mins self-pay    Timed Treatment:   34   mins   Total Treatment:     34   mins    Casi Dorado PT  Physical Therapist

## 2018-12-20 ENCOUNTER — OFFICE VISIT (OUTPATIENT)
Dept: FAMILY MEDICINE CLINIC | Facility: CLINIC | Age: 18
End: 2018-12-20

## 2018-12-20 VITALS
OXYGEN SATURATION: 96 % | BODY MASS INDEX: 20.91 KG/M2 | TEMPERATURE: 98.2 F | SYSTOLIC BLOOD PRESSURE: 100 MMHG | DIASTOLIC BLOOD PRESSURE: 62 MMHG | WEIGHT: 118 LBS | HEIGHT: 63 IN | HEART RATE: 67 BPM

## 2018-12-20 DIAGNOSIS — F41.9 ANXIETY: ICD-10-CM

## 2018-12-20 DIAGNOSIS — Z00.00 ROUTINE GENERAL MEDICAL EXAMINATION AT A HEALTH CARE FACILITY: Primary | ICD-10-CM

## 2018-12-20 PROCEDURE — 99395 PREV VISIT EST AGE 18-39: CPT | Performed by: NURSE PRACTITIONER

## 2018-12-20 PROCEDURE — 99213 OFFICE O/P EST LOW 20 MIN: CPT | Performed by: NURSE PRACTITIONER

## 2018-12-20 RX ORDER — CITALOPRAM 10 MG/1
10 TABLET ORAL DAILY
Qty: 90 TABLET | Refills: 3 | Status: SHIPPED | OUTPATIENT
Start: 2018-12-20 | End: 2019-03-14 | Stop reason: SDUPTHER

## 2018-12-20 NOTE — PROGRESS NOTES
"Subjective   Bruna Garcia is a 18 y.o. female.     History of Present Illness   Bruna Garcia 18 y.o. female who presents today for a new patient appointment.    she has a history of   Patient Active Problem List   Diagnosis   • Routine general medical examination at a health care facility   • Anxiety   .  she is here to establish care I reviewed the PFSH recorded today by my MA/LPN staff.   she   She has been feeling well.    Well Adult Physical: Patient here for a comprehensive physical exam.The patient reports anxiety  Do you take any herbs or supplements that were not prescribed by a doctor? no Are you taking calcium supplements? no Are you taking aspirin daily? no    Has been having anxiety and a anxious feeling for many months now and has been getting worse lately.  Trouble with family and tearful a lot.  Requesting something to help with anxiety.    The following portions of the patient's history were reviewed and updated as appropriate: allergies, current medications, past social history and problem list.    Review of Systems   All other systems reviewed and are negative.      Objective   /62   Pulse 67   Temp 98.2 °F (36.8 °C) (Oral)   Ht 160 cm (63\")   Wt 53.5 kg (118 lb)   LMP 12/06/2018   SpO2 96%   BMI 20.90 kg/m²   Physical Exam   Constitutional: She is oriented to person, place, and time. She appears well-developed and well-nourished. No distress.   HENT:   Head: Normocephalic and atraumatic. Hair is normal.   Right Ear: Hearing, tympanic membrane, external ear and ear canal normal. No drainage. No decreased hearing is noted.   Left Ear: Hearing, tympanic membrane, external ear and ear canal normal. No decreased hearing is noted.   Nose: No nasal deformity.   Mouth/Throat: Oropharynx is clear and moist.   Eyes: Conjunctivae, EOM and lids are normal. Pupils are equal, round, and reactive to light. Right eye exhibits no discharge. Left eye exhibits no discharge.   Neck: Normal range of " motion. Neck supple. No JVD present. No tracheal deviation present. No thyromegaly present.   Cardiovascular: Normal rate, regular rhythm, normal heart sounds, intact distal pulses and normal pulses. Exam reveals no gallop and no friction rub.   No murmur heard.  Pulmonary/Chest: Effort normal and breath sounds normal. No respiratory distress. She has no wheezes. She has no rales. She exhibits no tenderness.   Abdominal: Soft. Bowel sounds are normal. She exhibits no distension and no mass. There is no tenderness. There is no rebound and no guarding. No hernia.   Musculoskeletal: Normal range of motion. She exhibits no edema, tenderness or deformity.   Lymphadenopathy:     She has no cervical adenopathy.   Neurological: She is alert and oriented to person, place, and time. She has normal reflexes. She displays normal reflexes. No cranial nerve deficit. She exhibits normal muscle tone. Coordination normal.   Skin: Skin is warm and dry. No rash noted. She is not diaphoretic. No erythema.   Psychiatric: She has a normal mood and affect. Her behavior is normal. Judgment and thought content normal.   Vitals reviewed.      Assessment/Plan      Diagnosis Plan   1. Routine general medical examination at a health care facility     2. Anxiety  citalopram (CELEXA) 10 MG tablet     rto in 6 weeks  Discussed weight, diet and exercise   Teddy Gutierres, HARRIETT  12/20/2018

## 2018-12-27 ENCOUNTER — TREATMENT (OUTPATIENT)
Dept: PHYSICAL THERAPY | Facility: CLINIC | Age: 18
End: 2018-12-27

## 2018-12-27 DIAGNOSIS — M85.552: Primary | ICD-10-CM

## 2018-12-27 PROCEDURE — PTSPMIN2 PR PHYS THER SP 16 TO 30 MINUTES: Performed by: PHYSICAL THERAPIST

## 2018-12-27 NOTE — PROGRESS NOTES
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 provider found  Date of Initial Visit: No linked episodes  Today's Date: 2018  Patient seen for Visit count could not be calculated. Make sure you are using a visit which is associated with an episode. sessions             Subjective Pt states she has been feeling pretty good but still has to focus on her walking to correct it.    Objective   See Exercise, Manual, and Modality Logs for complete treatment. Pelvic landmarks even today      Assessment/Plan  Pts gait continues with L lateral trunk lean but it is decreased as compared with several weeks ago.  L hip strength dynamically emphasized today.       Manual Therapy:    14     mins  86797;  Therapeutic Exercise:    0     mins  96776;     Neuromuscular Prakash:    0    mins  29805;    Therapeutic Activity:     19     mins  80380;     Gait Trainin     mins  16141;     Ultrasound:     0     mins  15583;    Electrical Stimulation:    0     mins  28602 ( );  Dry Needling     0     mins self-pay    Timed Treatment:   33   mins   Total Treatment:     33   mins    Casi Dorado PT  Physical Therapist

## 2019-01-02 ENCOUNTER — TREATMENT (OUTPATIENT)
Dept: PHYSICAL THERAPY | Facility: CLINIC | Age: 19
End: 2019-01-02

## 2019-01-02 PROCEDURE — PTSPMIN1 PR PHYS THER SP UP TO 15 MINUTES: Performed by: PHYSICAL THERAPIST

## 2019-01-02 NOTE — PROGRESS NOTES
Physical Therapy Daily Progress Note      Patient: Bruna Garcia   : 2000  Diagnosis/ICD-10 Code:  No primary diagnosis found.  Referring practitioner: No ref. provider found  Date of Initial Visit: Type: THERAPY  Noted: 2018  Today's Date: 2019  Patient seen for 27 sessions               Subjective Pt states her back hasn't been bothering her and she thinks her walking is still improving    Objective   See Exercise, Manual, and Modality Logs for complete treatment.       Assessment/Plan  New hip ex added today for more dynamic progressin.       Manual Therapy:    14     mins  04007;  Therapeutic Exercise:    13     mins  54779;     Neuromuscular Prakash:    0    mins  47940;    Therapeutic Activity:     16     mins  78532;     Gait Trainin     mins  77497;     Ultrasound:     0     mins  96615;    Electrical Stimulation:    0     mins  05999 ( );  Dry Needling     0     mins self-pay    Timed Treatment:   43   mins   Total Treatment:     43   mins    Casi Dorado, PT  Physical Therapist

## 2019-01-07 ENCOUNTER — TREATMENT (OUTPATIENT)
Dept: PHYSICAL THERAPY | Facility: CLINIC | Age: 19
End: 2019-01-07

## 2019-01-07 DIAGNOSIS — M85.552: Primary | ICD-10-CM

## 2019-01-07 PROCEDURE — PTSPMIN2 PR PHYS THER SP 16 TO 30 MINUTES: Performed by: PHYSICAL THERAPIST

## 2019-01-07 NOTE — PROGRESS NOTES
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    Objective   See Exercise, Manual, and Modality Logs for complete treatment.       Assessment/Plan Pt is progressing with gait but lateral trunk lean still present and bridge with kickout reveals glute weakness         Manual Therapy:    17     mins  95754;  Therapeutic Exercise:    0     mins  21060;     Neuromuscular Prakash:    0    mins  01525;    Therapeutic Activity:     19     mins  49720;     Gait Trainin     mins  98207;     Ultrasound:     0     mins  46251;    Electrical Stimulation:    0     mins  06644 ( );  Dry Needling     0     mins self-pay    Timed Treatment:   37   mins   Total Treatment:     27   mins    Casi Dorado, PT  Physical Therapist

## 2019-01-31 ENCOUNTER — OFFICE VISIT (OUTPATIENT)
Dept: FAMILY MEDICINE CLINIC | Facility: CLINIC | Age: 19
End: 2019-01-31

## 2019-01-31 VITALS
SYSTOLIC BLOOD PRESSURE: 106 MMHG | HEART RATE: 67 BPM | WEIGHT: 117.4 LBS | DIASTOLIC BLOOD PRESSURE: 68 MMHG | BODY MASS INDEX: 20.8 KG/M2 | OXYGEN SATURATION: 99 % | TEMPERATURE: 98.2 F | HEIGHT: 63 IN

## 2019-01-31 DIAGNOSIS — F41.9 ANXIETY: Primary | ICD-10-CM

## 2019-01-31 PROBLEM — Z00.00 ROUTINE GENERAL MEDICAL EXAMINATION AT A HEALTH CARE FACILITY: Status: RESOLVED | Noted: 2018-12-20 | Resolved: 2019-01-31

## 2019-01-31 PROCEDURE — 99213 OFFICE O/P EST LOW 20 MIN: CPT | Performed by: NURSE PRACTITIONER

## 2019-01-31 NOTE — PROGRESS NOTES
"Subjective   Bruna Garcia is a 18 y.o. female.     History of Present Illness   Pt presenting to the office today for follow up of adding Celexa for anxiety.  She is using med as directed and it is working well without side effects.  She would like to continue with it as it is. No other issues today  The following portions of the patient's history were reviewed and updated as appropriate: allergies, current medications, past social history and problem list.    Review of Systems   All other systems reviewed and are negative.      Objective   /68 (BP Location: Right arm, Patient Position: Sitting)   Pulse 67   Temp 98.2 °F (36.8 °C)   Ht 160 cm (62.99\")   Wt 53.3 kg (117 lb 6.4 oz)   SpO2 99%   BMI 20.80 kg/m²   Physical Exam   Constitutional: She is oriented to person, place, and time. Vital signs are normal. She appears well-developed and well-nourished. No distress.   HENT:   Head: Normocephalic.   Cardiovascular: Normal rate, regular rhythm and normal heart sounds.   Pulmonary/Chest: Effort normal and breath sounds normal.   Neurological: She is alert and oriented to person, place, and time. Gait normal.   Psychiatric: She has a normal mood and affect. Her behavior is normal. Judgment and thought content normal.   Vitals reviewed.      Assessment/Plan      Diagnosis Plan   1. Anxiety       rto sven Gutierres, APRN  1/31/2019    "

## 2019-03-14 DIAGNOSIS — F41.9 ANXIETY: ICD-10-CM

## 2019-03-14 RX ORDER — CITALOPRAM 10 MG/1
10 TABLET ORAL DAILY
Qty: 90 TABLET | Refills: 3 | Status: SHIPPED | OUTPATIENT
Start: 2019-03-14 | End: 2019-06-06

## 2019-06-03 ENCOUNTER — TELEPHONE (OUTPATIENT)
Dept: OBSTETRICS AND GYNECOLOGY | Facility: CLINIC | Age: 19
End: 2019-06-03

## 2019-06-03 NOTE — TELEPHONE ENCOUNTER
Can she come by to leave clean catch urine sample for UA and possible Urine culture?  Can come by either office. Thanks!

## 2019-06-06 ENCOUNTER — OFFICE VISIT (OUTPATIENT)
Dept: OBSTETRICS AND GYNECOLOGY | Facility: CLINIC | Age: 19
End: 2019-06-06

## 2019-06-06 VITALS
HEART RATE: 97 BPM | SYSTOLIC BLOOD PRESSURE: 102 MMHG | DIASTOLIC BLOOD PRESSURE: 69 MMHG | HEIGHT: 63 IN | WEIGHT: 115 LBS | BODY MASS INDEX: 20.38 KG/M2

## 2019-06-06 DIAGNOSIS — N89.8 VAGINAL DISCHARGE: Primary | ICD-10-CM

## 2019-06-06 PROCEDURE — 99213 OFFICE O/P EST LOW 20 MIN: CPT | Performed by: OBSTETRICS & GYNECOLOGY

## 2019-06-06 RX ORDER — FLUCONAZOLE 150 MG/1
150 TABLET ORAL ONCE
Qty: 1 TABLET | Refills: 0 | Status: SHIPPED | OUTPATIENT
Start: 2019-06-06 | End: 2019-06-06

## 2019-06-06 NOTE — PROGRESS NOTES
SUBJECTIVE:   Chief Complaint   Patient presents with   • Urinary Tract Infection     pt reports burning when urinating, and throught the day, and vaginal itching denies odor, vaginal yellowish discharge.         Bruna Garcia is a 18 y.o.  who presents for vaginal discharge.  Started about one week ago. Reports white discharge without odor.  Reports recent sexual partner.  Would like STD testing for GC/CT/Trichomonas    Past Medical History:   Diagnosis Date   • Allergic    • Asthma      Past Surgical History:   Procedure Laterality Date   • HIP ARTHROSCOPY Left    • HIP SURGERY Left      OB History    Para Term  AB Living   0 0 0 0 0 0   SAB TAB Ectopic Molar Multiple Live Births   0 0 0 0 0 0            Social History     Tobacco Use   • Smoking status: Never Smoker   • Smokeless tobacco: Never Used   Substance Use Topics   • Alcohol use: Yes     Comment: social   • Drug use: Yes     Types: Marijuana     Comment: previously     Family History   Problem Relation Age of Onset   • No Known Problems Sister    • Breast cancer Maternal Grandmother    • Cancer Maternal Grandfather    • No Known Problems Sister    • No Known Problems Sister    • Ovarian cancer Neg Hx    • Uterine cancer Neg Hx    • Colon cancer Neg Hx      Current Outpatient Medications on File Prior to Visit   Medication Sig Dispense Refill   • [DISCONTINUED] citalopram (CELEXA) 10 MG tablet Take 1 tablet by mouth Daily. 90 tablet 3     No current facility-administered medications on file prior to visit.      No Known Allergies     Review of Systems   Constitutional: Negative for activity change, appetite change, chills, fatigue, fever and unexpected weight change.   HENT: Negative for congestion, nosebleeds and sore throat.    Eyes: Negative for visual disturbance.   Respiratory: Negative for cough, chest tightness and shortness of breath.    Cardiovascular: Negative for chest pain and palpitations.   Gastrointestinal: Negative  "for abdominal pain, constipation, diarrhea, nausea and vomiting.   Endocrine: Negative for polyuria.   Genitourinary: Negative for difficulty urinating, dyspareunia, dysuria, frequency, genital sores, hematuria, menstrual problem, pelvic pain, urgency, vaginal bleeding, vaginal discharge and vaginal pain.   Musculoskeletal: Negative for arthralgias and joint swelling.   Skin: Negative for color change and rash.   Neurological: Negative for dizziness, light-headedness and headaches.   Hematological: Does not bruise/bleed easily.   Psychiatric/Behavioral: Negative for agitation and dysphoric mood. The patient is not nervous/anxious.          OBJECTIVE:   Vitals:    06/06/19 1322   BP: 102/69   Pulse: 97   Weight: 52.2 kg (115 lb)   Height: 160 cm (62.99\")      Physical Exam   Constitutional: She is oriented to person, place, and time. She appears well-developed and well-nourished. No distress.   HENT:   Head: Normocephalic and atraumatic.   Eyes: EOM are normal. No scleral icterus.   Neck: Normal range of motion.   Cardiovascular: Normal rate and regular rhythm.   Pulmonary/Chest: Effort normal. No respiratory distress.   Abdominal: Soft. She exhibits no distension.   Genitourinary: Uterus normal and cervix normal. There is no rash, tenderness, lesion, injury or Bartholin's cyst on the right labia. There is no rash, tenderness, lesion, injury or Bartholin's cyst on the left labia. Cervix does not exhibit motion tenderness. Right adnexum displays no mass, no tenderness and no fullness. Right adnexum is present.Left adnexum displays no mass, no tenderness and no fullness. Left adnexum is present.Vagina exhibits no lesion and no loss of rugae. No erythema, tenderness or bleeding in the vagina. No foreign body in the vagina. No signs of injury around the vagina. Vaginal discharge found.   Musculoskeletal: Normal range of motion.   Neurological: She is alert and oriented to person, place, and time.   Skin: Skin is warm and " dry. No rash noted. She is not diaphoretic. No erythema.   Psychiatric: She has a normal mood and affect. Her behavior is normal. Judgment and thought content normal.     Wet Prep - + yeast, negative for clue cells, trichomonas    ASSESSMENT/PLAN:     ICD-10-CM ICD-9-CM   1. Vaginal discharge N89.8 623.5       Will treat for yeast but send swab and treat as indicated. UA within normal limits   Diflucan Rx sent.

## 2019-06-13 ENCOUNTER — TELEPHONE (OUTPATIENT)
Dept: OBSTETRICS AND GYNECOLOGY | Facility: CLINIC | Age: 19
End: 2019-06-13

## 2019-06-13 RX ORDER — METRONIDAZOLE 500 MG/1
500 TABLET ORAL 2 TIMES DAILY
Qty: 14 TABLET | Refills: 0 | Status: SHIPPED | OUTPATIENT
Start: 2019-06-13 | End: 2019-06-20

## 2019-06-13 NOTE — TELEPHONE ENCOUNTER
Patient informed that testing showed BV.  She has had several of these in the past.  She would like to try a week's worth of treatment and if no resolution, consider prolonged treatment.  Rx for Flagyl sent.  Patient will call back if no resolution.

## 2019-06-25 ENCOUNTER — TELEPHONE (OUTPATIENT)
Dept: OBSTETRICS AND GYNECOLOGY | Facility: CLINIC | Age: 19
End: 2019-06-25

## 2019-06-25 NOTE — TELEPHONE ENCOUNTER
Regarding: RE: Non-Urgent Medical Question  Contact: [phone]  ----- Message from Tl, Generic sent at 6/25/2019  2:08 PM EDT -----    Mk Anton,   Yes I am available anytime after 3! Thanks!  Bruna    ----- Message -----  From: Monika Anton MD  Sent: 6/25/19, 9:54 AM  To: Bruna Garcia  Subject: RE: Non-Urgent Medical Question    Bruna Terrazas -   Can I call you later today to go over a few things before we start these? Thanks!      ----- Message -----     From: Bruna Garcia     Sent: 6/24/2019  2:35 PM EDT       To: Monika Anton MD  Subject: Non-Urgent Medical Question    Hi Dr. Anton,   I am interested in starting birth control pills. Would you mind sending some in to my pharmacy? Let me know, thanks!     Bruna

## 2019-06-26 ENCOUNTER — TELEPHONE (OUTPATIENT)
Dept: OBSTETRICS AND GYNECOLOGY | Facility: CLINIC | Age: 19
End: 2019-06-26

## 2019-07-02 RX ORDER — NORETHINDRONE ACETATE AND ETHINYL ESTRADIOL 1MG-20(21)
1 KIT ORAL DAILY
Qty: 28 TABLET | Refills: 12 | Status: SHIPPED | OUTPATIENT
Start: 2019-07-02 | End: 2020-05-27

## 2019-07-02 RX ORDER — NORETHINDRONE ACETATE AND ETHINYL ESTRADIOL AND FERROUS FUMARATE 1MG-20(24)
1 KIT ORAL DAILY
Qty: 84 TABLET | Refills: 4 | Status: SHIPPED | OUTPATIENT
Start: 2019-07-02 | End: 2019-07-02

## 2019-07-02 NOTE — TELEPHONE ENCOUNTER
Spoke with patient regarding oral contraceptive pills.  She is currently on her menstrual cycle.  She reports she is used oral contraceptive pills in the past without any problems.  She denies a history of venous thrombi embolism, history of migraine with aura, hypertension, family history of clotting disorder, other contraindication to oral contraceptive pill.  She understands instructions and the risks and benefits including but not limited to Nausea, vomiting, hypertension, headache, increased risk of venous thromboembolism.  She was encouraged if a side effect is arise she should stop the medication and seek medical attention.  She is aware of what to do with missed pills, need for back-up contraception for 2 weeks after initiating days medications, condom use for STD prevention.    Loestrin 24 FE not covered. Tried to send generic.  If still not covered, pt to contact insurance for formulary or can try Trinessa

## 2019-07-15 RX ORDER — METRONIDAZOLE 7.5 MG/G
GEL VAGINAL 2 TIMES WEEKLY
Qty: 70 G | Refills: 0 | Status: SHIPPED | OUTPATIENT
Start: 2019-07-15 | End: 2019-10-21

## 2019-10-21 ENCOUNTER — OFFICE VISIT (OUTPATIENT)
Dept: OBSTETRICS AND GYNECOLOGY | Facility: CLINIC | Age: 19
End: 2019-10-21

## 2019-10-21 VITALS
SYSTOLIC BLOOD PRESSURE: 115 MMHG | WEIGHT: 118 LBS | DIASTOLIC BLOOD PRESSURE: 78 MMHG | HEART RATE: 77 BPM | HEIGHT: 63 IN | BODY MASS INDEX: 20.91 KG/M2

## 2019-10-21 DIAGNOSIS — Z01.419 WELL WOMAN EXAM WITH ROUTINE GYNECOLOGICAL EXAM: Primary | ICD-10-CM

## 2019-10-21 PROCEDURE — 99395 PREV VISIT EST AGE 18-39: CPT | Performed by: OBSTETRICS & GYNECOLOGY

## 2019-10-22 NOTE — PROGRESS NOTES
Chief Complaint   Patient presents with   • Annual Exam        Bruna Garcia is a 19 y.o.  who presents for an annual examination     Pap history:  Last pap: none  Prior abnormal paps: no  STDs  Sexually active: yes  History of STDs: no  Contraception:  OCP    Screening for BRCA-   Is patient's family history significant for BRCA risk factors? no    Past Medical History:   Diagnosis Date   • Allergic    • Asthma      Past Surgical History:   Procedure Laterality Date   • HIP ARTHROSCOPY Left    • HIP SURGERY Left      OB History    Para Term  AB Living   0 0 0 0 0 0   SAB TAB Ectopic Molar Multiple Live Births   0 0 0 0 0 0            Social History     Tobacco Use   • Smoking status: Never Smoker   • Smokeless tobacco: Never Used   Substance Use Topics   • Alcohol use: Yes     Comment: social   • Drug use: Yes     Types: Marijuana     Comment: previously     Family History   Problem Relation Age of Onset   • No Known Problems Sister    • Breast cancer Maternal Grandmother         mid 50's   • Cancer Maternal Grandfather    • No Known Problems Sister    • No Known Problems Sister    • Ovarian cancer Neg Hx    • Uterine cancer Neg Hx    • Colon cancer Neg Hx    • Deep vein thrombosis Neg Hx    • Pulmonary embolism Neg Hx      Current Outpatient Medications on File Prior to Visit   Medication Sig Dispense Refill   • norethindrone-ethinyl estradiol FE (JUNEL FE 1/20) 1-20 MG-MCG per tablet Take 1 tablet by mouth Daily. 28 tablet 12   • [DISCONTINUED] metroNIDAZOLE (METROGEL) 0.75 % vaginal gel Insert  into the vagina 2 (Two) Times a Week. 70 g 0     No current facility-administered medications on file prior to visit.      No Known Allergies     Review of Systems   Constitutional: Negative for activity change, appetite change, fatigue, fever and unexpected weight change.   Gastrointestinal: Negative for abdominal pain, nausea and vomiting.   Genitourinary: Positive for vaginal discharge. Negative  "for menstrual problem, vaginal bleeding and vaginal pain.   Hematological: Does not bruise/bleed easily.   Psychiatric/Behavioral: Negative for agitation.         OBJECTIVE:   Vitals:    10/21/19 1459   BP: 115/78   Pulse: 77   Weight: 53.5 kg (118 lb)   Height: 160 cm (62.99\")      Physical Exam   Constitutional: She is oriented to person, place, and time. She appears well-developed and well-nourished. No distress.   HENT:   Head: Normocephalic and atraumatic.   Eyes: EOM are normal. No scleral icterus.   Neck: Normal range of motion. Neck supple. No thyromegaly present.   Cardiovascular: Normal rate and regular rhythm. Exam reveals no gallop and no friction rub.   No murmur heard.  Pulmonary/Chest: Effort normal and breath sounds normal. No respiratory distress. She has no wheezes. She has no rales. She exhibits no tenderness. Right breast exhibits no inverted nipple. Left breast exhibits no inverted nipple. Breasts are symmetrical. There is no breast swelling.   Abdominal: Soft. Bowel sounds are normal. She exhibits no distension. There is no tenderness. There is no guarding.   Genitourinary: Vagina normal and uterus normal. There is no rash, tenderness, lesion, injury or Bartholin's cyst on the right labia. There is no rash, tenderness, lesion, injury or Bartholin's cyst on the left labia. Uterus is not mobile.   Cervix is not parous. Cervix does not exhibit motion tenderness, discharge, friability, lesion, polyp, nabothian cyst, eversion, pinkness or cyanosis. Right adnexum displays no mass, no tenderness and no fullness. Right adnexum is present.Left adnexum displays no mass, no tenderness and no fullness. Left adnexum is present.No vaginal discharge found.   Musculoskeletal: She exhibits no edema or deformity.   Neurological: She is alert and oriented to person, place, and time.   Skin: Skin is warm and dry. She is not diaphoretic.   Psychiatric: She has a normal mood and affect. Her speech is normal and " behavior is normal. Judgment and thought content normal. Cognition and memory are normal.       ASSESSMENT/PLAN:     Annual well woman exam:  Cervical cancer screening:    No cervical dysplasia in past   The patient is due for a pap today.    Screening guidelines discussed with patient  Breast cancer screening:    Clinical breast exam recommended for age 20-39 years every 1-3 years   Mammogram recommended starting age 40    Breast self awareness encouraged  STD Screening   Testing desires GC/CT/Trichomonas.    Contraception :   Desires to continue oral contraceptive pill and has no contraindication    Family history    does not demonstrate need for genetics referral   Healthy lifestyle counseling:   return for routine annual checkups   Vaginal discharge - treat based on results.  Recommend suppression if frequent BV and pt agrees    BMI Counseling  Her BMI is classified as BMI 18.5-24.9       Classification: normal weight.        No Follow-up on file.

## 2019-10-23 LAB
CONV .: NORMAL
CYTOLOGIST CVX/VAG CYTO: NORMAL
CYTOLOGY CVX/VAG DOC CYTO: NORMAL
CYTOLOGY CVX/VAG DOC THIN PREP: NORMAL
DX ICD CODE: NORMAL
HIV 1 & 2 AB SER-IMP: NORMAL
OTHER STN SPEC: NORMAL
STAT OF ADQ CVX/VAG CYTO-IMP: NORMAL

## 2019-10-24 ENCOUNTER — TELEPHONE (OUTPATIENT)
Dept: OBSTETRICS AND GYNECOLOGY | Facility: CLINIC | Age: 19
End: 2019-10-24

## 2019-10-24 LAB
A VAGINAE DNA VAG QL NAA+PROBE: ABNORMAL SCORE
BVAB2 DNA VAG QL NAA+PROBE: ABNORMAL SCORE
C ALBICANS DNA VAG QL NAA+PROBE: POSITIVE
C GLABRATA DNA VAG QL NAA+PROBE: NEGATIVE
C TRACH RRNA SPEC QL NAA+PROBE: NEGATIVE
MEGA1 DNA VAG QL NAA+PROBE: ABNORMAL SCORE
N GONORRHOEA RRNA SPEC QL NAA+PROBE: NEGATIVE
T VAGINALIS RRNA SPEC QL NAA+PROBE: NEGATIVE

## 2019-10-24 RX ORDER — FLUCONAZOLE 150 MG/1
150 TABLET ORAL ONCE
Qty: 1 TABLET | Refills: 0 | Status: SHIPPED | OUTPATIENT
Start: 2019-10-24 | End: 2019-10-24

## 2019-10-25 NOTE — TELEPHONE ENCOUNTER
Please call patient and let her know that her testing came back positive for yeast.  She may use over the counter monistat therapies if she prefers, but I will send diflucan to her pharmacy. Thanks!

## 2019-10-29 ENCOUNTER — TELEPHONE (OUTPATIENT)
Dept: OBSTETRICS AND GYNECOLOGY | Facility: CLINIC | Age: 19
End: 2019-10-29

## 2020-01-03 ENCOUNTER — PATIENT MESSAGE (OUTPATIENT)
Dept: OBSTETRICS AND GYNECOLOGY | Facility: CLINIC | Age: 20
End: 2020-01-03

## 2020-01-07 RX ORDER — METRONIDAZOLE 500 MG/1
500 TABLET ORAL 2 TIMES DAILY
Qty: 14 TABLET | Refills: 0 | Status: SHIPPED | OUTPATIENT
Start: 2020-01-07 | End: 2020-01-14

## 2020-05-27 RX ORDER — NORETHINDRONE ACETATE AND ETHINYL ESTRADIOL AND FERROUS FUMARATE 1MG-20(21)
KIT ORAL
Qty: 84 TABLET | Refills: 4 | Status: SHIPPED | OUTPATIENT
Start: 2020-05-27 | End: 2021-08-13

## 2020-07-21 ENCOUNTER — OFFICE VISIT (OUTPATIENT)
Dept: FAMILY MEDICINE CLINIC | Facility: CLINIC | Age: 20
End: 2020-07-21

## 2020-07-21 VITALS
TEMPERATURE: 97.1 F | OXYGEN SATURATION: 98 % | HEART RATE: 73 BPM | WEIGHT: 119 LBS | SYSTOLIC BLOOD PRESSURE: 110 MMHG | DIASTOLIC BLOOD PRESSURE: 70 MMHG | BODY MASS INDEX: 21.09 KG/M2 | HEIGHT: 63 IN

## 2020-07-21 DIAGNOSIS — Z11.1 SCREENING EXAMINATION FOR PULMONARY TUBERCULOSIS: ICD-10-CM

## 2020-07-21 DIAGNOSIS — Z13.1 SCREENING FOR DIABETES MELLITUS: ICD-10-CM

## 2020-07-21 DIAGNOSIS — Z13.0 SCREENING FOR IRON DEFICIENCY ANEMIA: ICD-10-CM

## 2020-07-21 DIAGNOSIS — Z00.00 ROUTINE GENERAL MEDICAL EXAMINATION AT A HEALTH CARE FACILITY: Primary | ICD-10-CM

## 2020-07-21 DIAGNOSIS — Z23 NEED FOR VACCINATION: ICD-10-CM

## 2020-07-21 DIAGNOSIS — Z13.6 SCREENING FOR ISCHEMIC HEART DISEASE: ICD-10-CM

## 2020-07-21 PROCEDURE — 90715 TDAP VACCINE 7 YRS/> IM: CPT | Performed by: NURSE PRACTITIONER

## 2020-07-21 PROCEDURE — 99395 PREV VISIT EST AGE 18-39: CPT | Performed by: NURSE PRACTITIONER

## 2020-07-21 PROCEDURE — 90471 IMMUNIZATION ADMIN: CPT | Performed by: NURSE PRACTITIONER

## 2020-07-21 NOTE — PROGRESS NOTES
"Mati Garcia is a 20 y.o. female.     History of Present Illness   Well Adult Physical: Patient here for a comprehensive physical exam.The patient reports no problems  Do you take any herbs or supplements that were not prescribed by a doctor? no Are you taking calcium supplements? no Are you taking aspirin daily? no      The following portions of the patient's history were reviewed and updated as appropriate: allergies, current medications, past social history and problem list.    Review of Systems   Constitutional: Negative for fever.   Respiratory: Negative for cough and shortness of breath.    Cardiovascular: Negative for chest pain.   Gastrointestinal: Negative for abdominal pain.   Neurological: Negative for dizziness.       Objective   /70   Pulse 73   Temp 97.1 °F (36.2 °C)   Ht 160 cm (62.99\")   Wt 54 kg (119 lb)   SpO2 98%   BMI 21.09 kg/m²   Physical Exam   Constitutional: She is oriented to person, place, and time. Vital signs are normal. She appears well-developed and well-nourished. No distress.   HENT:   Head: Normocephalic.   Cardiovascular: Normal rate, regular rhythm and normal heart sounds.   Pulmonary/Chest: Effort normal and breath sounds normal.   Neurological: She is alert and oriented to person, place, and time. Gait normal.   Psychiatric: She has a normal mood and affect. Her behavior is normal. Judgment and thought content normal.   Vitals reviewed.      Assessment/Plan      Diagnosis Plan   1. Routine general medical examination at a health care facility     2. Screening for iron deficiency anemia  CBC & Differential   3. Screening for diabetes mellitus  Comprehensive Metabolic Panel   4. Screening for ischemic heart disease  Lipid Panel With LDL / HDL Ratio   5. Screening examination for pulmonary tuberculosis  QuantiFERON TB Gold     Discussed weight, diet and exercise  Follow up after labs      HARRIETT Mujica  7/21/2020  "

## 2020-07-24 LAB
ALBUMIN SERPL-MCNC: 4.4 G/DL (ref 3.9–5)
ALBUMIN/GLOB SERPL: 1.6 {RATIO} (ref 1.2–2.2)
ALP SERPL-CCNC: 55 IU/L (ref 39–117)
ALT SERPL-CCNC: 8 IU/L (ref 0–32)
AST SERPL-CCNC: 12 IU/L (ref 0–40)
BASOPHILS # BLD AUTO: 0.1 X10E3/UL (ref 0–0.2)
BASOPHILS NFR BLD AUTO: 2 %
BILIRUB SERPL-MCNC: <0.2 MG/DL (ref 0–1.2)
BUN SERPL-MCNC: 12 MG/DL (ref 6–20)
BUN/CREAT SERPL: 17 (ref 9–23)
CALCIUM SERPL-MCNC: 9.8 MG/DL (ref 8.7–10.2)
CHLORIDE SERPL-SCNC: 103 MMOL/L (ref 96–106)
CHOLEST SERPL-MCNC: 169 MG/DL (ref 100–199)
CO2 SERPL-SCNC: 22 MMOL/L (ref 20–29)
CREAT SERPL-MCNC: 0.69 MG/DL (ref 0.57–1)
EOSINOPHIL # BLD AUTO: 0.3 X10E3/UL (ref 0–0.4)
EOSINOPHIL NFR BLD AUTO: 3 %
ERYTHROCYTE [DISTWIDTH] IN BLOOD BY AUTOMATED COUNT: 12.6 % (ref 11.7–15.4)
GAMMA INTERFERON BACKGROUND BLD IA-ACNC: 0.01 IU/ML
GLOBULIN SER CALC-MCNC: 2.7 G/DL (ref 1.5–4.5)
GLUCOSE SERPL-MCNC: 91 MG/DL (ref 65–99)
HCT VFR BLD AUTO: 45.3 % (ref 34–46.6)
HDLC SERPL-MCNC: 70 MG/DL
HGB BLD-MCNC: 14.9 G/DL (ref 11.1–15.9)
IMM GRANULOCYTES # BLD AUTO: 0 X10E3/UL (ref 0–0.1)
IMM GRANULOCYTES NFR BLD AUTO: 0 %
LDLC SERPL CALC-MCNC: 88 MG/DL (ref 0–99)
LDLC/HDLC SERPL: 1.3 RATIO (ref 0–3.2)
LYMPHOCYTES # BLD AUTO: 2.4 X10E3/UL (ref 0.7–3.1)
LYMPHOCYTES NFR BLD AUTO: 30 %
M TB IFN-G BLD-IMP: NEGATIVE
M TB IFN-G CD4+ BCKGRND COR BLD-ACNC: 0.02 IU/ML
M TB IFN-G CD4+CD8+ BCKGRND COR BLD-ACNC: 0.01 IU/ML
MCH RBC QN AUTO: 30.3 PG (ref 26.6–33)
MCHC RBC AUTO-ENTMCNC: 32.9 G/DL (ref 31.5–35.7)
MCV RBC AUTO: 92 FL (ref 79–97)
MITOGEN IGNF BLD-ACNC: >10 IU/ML
MONOCYTES # BLD AUTO: 0.6 X10E3/UL (ref 0.1–0.9)
MONOCYTES NFR BLD AUTO: 8 %
NEUTROPHILS # BLD AUTO: 4.8 X10E3/UL (ref 1.4–7)
NEUTROPHILS NFR BLD AUTO: 57 %
PLATELET # BLD AUTO: 359 X10E3/UL (ref 150–450)
POTASSIUM SERPL-SCNC: 4.5 MMOL/L (ref 3.5–5.2)
PROT SERPL-MCNC: 7.1 G/DL (ref 6–8.5)
QUANTIFERON INCUBATION: NORMAL
RBC # BLD AUTO: 4.92 X10E6/UL (ref 3.77–5.28)
SERVICE CMNT-IMP: NORMAL
SODIUM SERPL-SCNC: 139 MMOL/L (ref 134–144)
TRIGL SERPL-MCNC: 56 MG/DL (ref 0–149)
VLDLC SERPL CALC-MCNC: 11 MG/DL (ref 5–40)
WBC # BLD AUTO: 8.3 X10E3/UL (ref 3.4–10.8)

## 2020-09-22 ENCOUNTER — TRANSCRIBE ORDERS (OUTPATIENT)
Dept: PHYSICAL THERAPY | Facility: HOSPITAL | Age: 20
End: 2020-09-22

## 2020-09-22 DIAGNOSIS — M25.559 HIP PAIN: Primary | ICD-10-CM

## 2020-09-25 ENCOUNTER — TELEPHONE (OUTPATIENT)
Dept: FAMILY MEDICINE CLINIC | Facility: CLINIC | Age: 20
End: 2020-09-25

## 2020-09-25 NOTE — TELEPHONE ENCOUNTER
PT CALLED REQUESTING TO  THE RECORDS FOR HER RECENT TDAP VACCINE AND THE RECORD AND RESULT FROM HER TB TEST.    PLEASE CALL PT AND LET HER KNOW WHEN THESE ARE READY TO BE PICKED UP. THEY NEED TO BE TURNED IN NEXT WEEK, SO SHE WOULD LIKE TO PICK THEM UP ASAP.    SHE CAN BE REACHED [phone]

## 2020-10-14 ENCOUNTER — TELEPHONE (OUTPATIENT)
Dept: PHYSICAL THERAPY | Facility: CLINIC | Age: 20
End: 2020-10-14

## 2020-11-23 ENCOUNTER — PATIENT MESSAGE (OUTPATIENT)
Dept: OBSTETRICS AND GYNECOLOGY | Facility: CLINIC | Age: 20
End: 2020-11-23

## 2020-11-23 RX ORDER — METRONIDAZOLE 500 MG/1
500 TABLET ORAL 2 TIMES DAILY
Qty: 14 TABLET | Refills: 0 | Status: SHIPPED | OUTPATIENT
Start: 2020-11-23 | End: 2020-11-30

## 2020-12-14 ENCOUNTER — OFFICE VISIT (OUTPATIENT)
Dept: OBSTETRICS AND GYNECOLOGY | Facility: CLINIC | Age: 20
End: 2020-12-14

## 2020-12-14 VITALS
DIASTOLIC BLOOD PRESSURE: 80 MMHG | SYSTOLIC BLOOD PRESSURE: 134 MMHG | HEIGHT: 63 IN | BODY MASS INDEX: 20.27 KG/M2 | WEIGHT: 114.4 LBS | HEART RATE: 83 BPM

## 2020-12-14 DIAGNOSIS — Z01.419 WELL WOMAN EXAM WITH ROUTINE GYNECOLOGICAL EXAM: Primary | ICD-10-CM

## 2020-12-14 DIAGNOSIS — N89.8 VAGINAL DISCHARGE: ICD-10-CM

## 2020-12-14 PROCEDURE — 99395 PREV VISIT EST AGE 18-39: CPT | Performed by: OBSTETRICS & GYNECOLOGY

## 2020-12-14 NOTE — PROGRESS NOTES
Chief Complaint   Patient presents with   • Gynecologic Exam     AE; last pap 10/19; irreg cycles on OCP's since VIP in 10/20.        Bruna Garcia is a 20 y.o.  who presents for an annual examination     Pap history:  Last pap: Oct 2019 NIL  Prior abnormal paps: no  STDs  Sexually active: yes  History of STDs: no  Contraception:  OCP- c/o spotting since TAB in October.  She reports she went back to [facility] and had a normal ultrasound.  She restarted her oral contraceptive pill.  Feels that the oral contraceptive pill failed because she missed them. Declines other options at this time.     Screening for BRCA-   Is patient's family history significant for BRCA risk factors? no    Past Medical History:   Diagnosis Date   • Allergic    • Asthma    • Giant cell tumor     L hip     Past Surgical History:   Procedure Laterality Date   • EXCISION TUMOR HIP / PELVIS Left    • HIP ARTHROSCOPY Left    • HIP SURGERY Left      OB History    Para Term  AB Living   0 0 0 0 0 0   SAB TAB Ectopic Molar Multiple Live Births   0 0 0 0 0 0      Social History     Tobacco Use   • Smoking status: Never Smoker   • Smokeless tobacco: Never Used   Substance Use Topics   • Alcohol use: Yes     Comment: social   • Drug use: Yes     Types: Marijuana     Comment: previously     Family History   Problem Relation Age of Onset   • No Known Problems Sister    • Breast cancer Maternal Grandmother         mid 50's   • Cancer Maternal Grandfather    • No Known Problems Sister    • No Known Problems Sister    • Ovarian cancer Neg Hx    • Uterine cancer Neg Hx    • Colon cancer Neg Hx    • Deep vein thrombosis Neg Hx    • Pulmonary embolism Neg Hx      Current Outpatient Medications on File Prior to Visit   Medication Sig Dispense Refill   • JUNEL FE 1/20 1-20 MG-MCG per tablet TAKE 1 TABLET BY MOUTH EVERY DAY 84 tablet 4     No current facility-administered medications on file prior to visit.      No Known Allergies  "    Review of Systems   Constitutional: Negative for activity change, appetite change, fatigue, fever and unexpected weight change.   Gastrointestinal: Negative for abdominal pain, nausea and vomiting.   Genitourinary: Positive for vaginal discharge. Negative for menstrual problem, vaginal bleeding and vaginal pain.   Hematological: Does not bruise/bleed easily.   Psychiatric/Behavioral: Negative for agitation.         OBJECTIVE:   Vitals:    12/14/20 1440   BP: 134/80   Pulse: 83   Weight: 51.9 kg (114 lb 6.4 oz)   Height: 160 cm (63\")      Physical Exam   Constitutional: She is oriented to person, place, and time. She appears well-developed and well-nourished. No distress.   HENT:   Head: Normocephalic and atraumatic.   Eyes: No scleral icterus.   Neck: Normal range of motion. Neck supple. No thyromegaly present.   Cardiovascular: Normal rate and regular rhythm. Exam reveals no gallop and no friction rub.   No murmur heard.  Pulmonary/Chest: Effort normal and breath sounds normal. No respiratory distress. She has no wheezes. She has no rales. She exhibits no tenderness. Right breast exhibits no inverted nipple. Left breast exhibits no inverted nipple. No breast swelling, tenderness, discharge or bleeding. Breasts are symmetrical.   Abdominal: Soft. Bowel sounds are normal. She exhibits no distension. There is no abdominal tenderness. There is no guarding.   Genitourinary: Vagina normal and uterus normal. There is no rash, tenderness, lesion, injury or Bartholin's cyst on the right labia. There is no rash, tenderness, lesion, injury or Bartholin's cyst on the left labia. Uterus is not mobile.   Cervix is not parous. Cervix does not exhibit motion tenderness, discharge, friability, lesion, polyp, nabothian cyst, eversion, pinkness or cyanosis. Right adnexum displays no mass, no tenderness and no fullness. Right adnexum is present.Left adnexum displays no mass, no tenderness and no fullness. Left adnexum is " present.No vaginal discharge found.   Musculoskeletal: No deformity.   Neurological: She is alert and oriented to person, place, and time.   Skin: Skin is warm and dry. She is not diaphoretic.   Psychiatric: Her speech is normal and behavior is normal. Judgment and thought content normal.       ASSESSMENT/PLAN:     Annual well woman exam:  Cervical cancer screening:    No cervical dysplasia in past   The patient is due for a pap in 2022.    Screening guidelines discussed with patient  Breast cancer screening:    Clinical breast exam recommended for age 20-39 years every 1-3 years   Mammogram recommended starting age 40    Breast self awareness encouraged  STD Screening   Testing declines  Contraception :   Desires to continue oral contraceptive pill and has no contraindication .  She was counseled on alternatives and she reports she is much better at taking the oral contraceptive pill in a timely fashion now   Reviewed spotting over last 1-2 months. Plan for serum hcg and if positive would recommend ultrasound. If negative, consider oral contraceptive pill taper.   Family history    does not demonstrate need for genetics referral   Healthy lifestyle counseling:   return for routine annual checkups    BMI Counseling  Her BMI is classified as BMI 18.5-24.9       Classification: normal weight.        Return in about 1 year (around 12/14/2021) for Annual physical.

## 2020-12-19 LAB
A VAGINAE DNA VAG QL NAA+PROBE: ABNORMAL SCORE
BVAB2 DNA VAG QL NAA+PROBE: ABNORMAL SCORE
C ALBICANS DNA VAG QL NAA+PROBE: POSITIVE
C GLABRATA DNA VAG QL NAA+PROBE: NEGATIVE
C TRACH DNA VAG QL NAA+PROBE: NEGATIVE
MEGA1 DNA VAG QL NAA+PROBE: ABNORMAL SCORE
N GONORRHOEA DNA VAG QL NAA+PROBE: NEGATIVE
T VAGINALIS DNA VAG QL NAA+PROBE: NEGATIVE

## 2020-12-21 ENCOUNTER — TELEPHONE (OUTPATIENT)
Dept: OBSTETRICS AND GYNECOLOGY | Facility: CLINIC | Age: 20
End: 2020-12-21

## 2020-12-21 RX ORDER — FLUCONAZOLE 150 MG/1
150 TABLET ORAL ONCE
Qty: 1 TABLET | Refills: 0 | Status: SHIPPED | OUTPATIENT
Start: 2020-12-21 | End: 2020-12-21

## 2020-12-21 NOTE — TELEPHONE ENCOUNTER
Patient is aware of results and stated that she has not been able to make it in for her blood draw yet but should be in today or tomorrow.

## 2020-12-21 NOTE — TELEPHONE ENCOUNTER
Please call patient and let her know that her testing came back positive for yeast.  She may use over the counter monistat therapies if she prefers, but I will send diflucan to her pharmacy. Thanks!    Also, there is a lab that has not yet resulted. Check to see if patient had blood drawn. Thanks!

## 2021-02-26 ENCOUNTER — OFFICE VISIT (OUTPATIENT)
Dept: OBSTETRICS AND GYNECOLOGY | Facility: CLINIC | Age: 21
End: 2021-02-26

## 2021-02-26 VITALS
HEIGHT: 63 IN | BODY MASS INDEX: 21.09 KG/M2 | HEART RATE: 61 BPM | WEIGHT: 119 LBS | SYSTOLIC BLOOD PRESSURE: 108 MMHG | DIASTOLIC BLOOD PRESSURE: 69 MMHG

## 2021-02-26 DIAGNOSIS — N89.8 VAGINAL DISCHARGE: Primary | ICD-10-CM

## 2021-02-26 PROCEDURE — 99213 OFFICE O/P EST LOW 20 MIN: CPT | Performed by: OBSTETRICS & GYNECOLOGY

## 2021-02-26 NOTE — PROGRESS NOTES
SUBJECTIVE:   Chief Complaint   Patient presents with   • Vaginal Discharge     pt reports vaginal discharge (yellow) and odor.       Bruna Garcia is a 20 y.o.  here with complaint of vaginal discharge  Reports fishy odor  Reports no major itching  Duration: couple weeks  Reports finished flagyl and the symptoms went away      Past Medical History:   Diagnosis Date   • Allergic    • Asthma    • Giant cell tumor     L hip     Past Surgical History:   Procedure Laterality Date   • EXCISION TUMOR HIP / PELVIS Left    • HIP ARTHROSCOPY Left    • HIP SURGERY Left      Social History     Tobacco Use   • Smoking status: Never Smoker   • Smokeless tobacco: Never Used   Substance Use Topics   • Alcohol use: Yes     Comment: social   • Drug use: Yes     Types: Marijuana     Comment: previously     Family History   Problem Relation Age of Onset   • No Known Problems Sister    • Breast cancer Maternal Grandmother         mid 50's   • Cancer Maternal Grandfather    • No Known Problems Sister    • No Known Problems Sister    • Ovarian cancer Neg Hx    • Uterine cancer Neg Hx    • Colon cancer Neg Hx    • Deep vein thrombosis Neg Hx    • Pulmonary embolism Neg Hx         Current Outpatient Medications on File Prior to Visit   Medication Sig Dispense Refill   • JUNEL FE 1/20 1-20 MG-MCG per tablet TAKE 1 TABLET BY MOUTH EVERY DAY 84 tablet 4     No current facility-administered medications on file prior to visit.       No Known Allergies     Past Medical History:   Diagnosis Date   • Allergic    • Asthma    • Giant cell tumor     L hip     Past Surgical History:   Procedure Laterality Date   • EXCISION TUMOR HIP / PELVIS Left    • HIP ARTHROSCOPY Left    • HIP SURGERY Left      OB History    Para Term  AB Living   0 0 0 0 0 0   SAB TAB Ectopic Molar Multiple Live Births   0 0 0 0 0 0      Social History     Tobacco Use   • Smoking status: Never Smoker   • Smokeless tobacco: Never Used   Substance Use Topics  "  • Alcohol use: Yes     Comment: social   • Drug use: Yes     Types: Marijuana     Comment: previously     Family History   Problem Relation Age of Onset   • No Known Problems Sister    • Breast cancer Maternal Grandmother         mid 50's   • Cancer Maternal Grandfather    • No Known Problems Sister    • No Known Problems Sister    • Ovarian cancer Neg Hx    • Uterine cancer Neg Hx    • Colon cancer Neg Hx    • Deep vein thrombosis Neg Hx    • Pulmonary embolism Neg Hx      Current Outpatient Medications on File Prior to Visit   Medication Sig Dispense Refill   • JUNEL FE 1/20 1-20 MG-MCG per tablet TAKE 1 TABLET BY MOUTH EVERY DAY 84 tablet 4     No current facility-administered medications on file prior to visit.      No Known Allergies     Review of Systems      OBJECTIVE:   Vitals:    02/26/21 0902   BP: 108/69   Pulse: 61   Weight: 54 kg (119 lb)   Height: 160 cm (62.99\")      Physical Exam   Constitutional: She is oriented to person, place, and time. She appears well-developed and well-nourished. No distress.   HENT:   Head: Normocephalic and atraumatic.   Cardiovascular: Normal rate, well perfused   Pulmonary/Chest: Effort normal,. No respiratory distress.   Abdominal: Soft. She exhibits no distension and no mass. There is no tenderness.   Genitourinary: normal external genitalia, normal vagina aside from scant greay discharge, cervix, uterus, adnexa nontender   Musculoskeletal: Normal range of motion. She exhibits no edema or deformity.   Neurological: She is alert and oriented to person, place, and time.   Skin: Skin is warm and dry. No rash noted. She is not diaphoretic.   Psychiatric: She has a normal mood and affect. Her behavior is normal. Judgment and thought content normal.         ASSESSMENT/PLAN:     ICD-10-CM ICD-9-CM   1. Vaginal discharge  N89.8 623.5     Patient desires testing for gonorrhea, chlamydia and trichomonas, NuSwab + sent  Rx PRN results.  If this test is + for BV, will treat with " Flagyl and then do suppression given recurrence with weekly Flagyl gel. Counseled on use of boric acid suppositories and she may try these in the meantime. Aware of toxicity if taken PO  Counseled on indications for prolonged treatment (recurrent infections), indications for further work up (Skin changes, ulcerations, symptoms do not resolve).        Orders Placed This Encounter   Procedures   • NuSwab VG+ - Swab, Vagina        Return in about 8 months (around 10/26/2021) for Annual physical.

## 2021-03-03 LAB
A VAGINAE DNA VAG QL NAA+PROBE: ABNORMAL SCORE
BVAB2 DNA VAG QL NAA+PROBE: ABNORMAL SCORE
C ALBICANS DNA VAG QL NAA+PROBE: NEGATIVE
C GLABRATA DNA VAG QL NAA+PROBE: NEGATIVE
C TRACH DNA VAG QL NAA+PROBE: NEGATIVE
MEGA1 DNA VAG QL NAA+PROBE: ABNORMAL SCORE
N GONORRHOEA DNA VAG QL NAA+PROBE: NEGATIVE
T VAGINALIS DNA VAG QL NAA+PROBE: NEGATIVE

## 2021-03-04 ENCOUNTER — TELEPHONE (OUTPATIENT)
Dept: OBSTETRICS AND GYNECOLOGY | Facility: CLINIC | Age: 21
End: 2021-03-04

## 2021-03-04 RX ORDER — METRONIDAZOLE 7.5 MG/G
GEL VAGINAL
Qty: 70 G | Refills: 3 | OUTPATIENT
Start: 2021-03-04 | End: 2021-07-11

## 2021-03-04 RX ORDER — METRONIDAZOLE 500 MG/1
500 TABLET ORAL 2 TIMES DAILY
Qty: 14 TABLET | Refills: 0 | Status: SHIPPED | OUTPATIENT
Start: 2021-03-04 | End: 2021-03-11

## 2021-03-04 NOTE — TELEPHONE ENCOUNTER
03/04/21  Patient is informed of positive BV and Rx to  at pharmacy and instructions given on how to treat this.

## 2021-03-04 NOTE — TELEPHONE ENCOUNTER
Please call patient and let her know that her testing came back positive for bacterial vaginosis.  I will send flagyl to her pharmacy for treatment to be followed by weekly metrogel (vaginal gel) x 12 weeks for suppression due to recurrence.

## 2021-04-16 ENCOUNTER — BULK ORDERING (OUTPATIENT)
Dept: CASE MANAGEMENT | Facility: OTHER | Age: 21
End: 2021-04-16

## 2021-04-16 DIAGNOSIS — Z23 IMMUNIZATION DUE: ICD-10-CM

## 2021-07-11 ENCOUNTER — APPOINTMENT (OUTPATIENT)
Dept: GENERAL RADIOLOGY | Facility: HOSPITAL | Age: 21
End: 2021-07-11

## 2021-07-11 PROCEDURE — 73110 X-RAY EXAM OF WRIST: CPT | Performed by: STUDENT IN AN ORGANIZED HEALTH CARE EDUCATION/TRAINING PROGRAM

## 2021-07-11 PROCEDURE — 73130 X-RAY EXAM OF HAND: CPT | Performed by: STUDENT IN AN ORGANIZED HEALTH CARE EDUCATION/TRAINING PROGRAM

## 2021-07-11 PROCEDURE — 73090 X-RAY EXAM OF FOREARM: CPT | Performed by: STUDENT IN AN ORGANIZED HEALTH CARE EDUCATION/TRAINING PROGRAM

## 2021-07-13 ENCOUNTER — OFFICE VISIT (OUTPATIENT)
Dept: ORTHOPEDIC SURGERY | Facility: CLINIC | Age: 21
End: 2021-07-13

## 2021-07-13 DIAGNOSIS — S62.343A CLOSED NONDISPLACED FRACTURE OF BASE OF THIRD METACARPAL BONE OF LEFT HAND, INITIAL ENCOUNTER: Primary | ICD-10-CM

## 2021-07-13 PROCEDURE — 26600 TREAT METACARPAL FRACTURE: CPT | Performed by: ORTHOPAEDIC SURGERY

## 2021-07-13 PROCEDURE — 99203 OFFICE O/P NEW LOW 30 MIN: CPT | Performed by: ORTHOPAEDIC SURGERY

## 2021-07-13 NOTE — PROGRESS NOTES
Chief Complaint  Injury of the Left Wrist    Subjective    History of Present Illness      Bruna Garcia is a 21 y.o. female who presents to [facility] for injury to the left hand.  History of Present Illness     The patient fell over her dog 3 nights ago. She was going to the bathroom when it was pitch black and fell awkwardly and braced her fall with her left hand. She had a very significant amount of swelling and bruising. She was unable to make a fist. She was seen in the emergency room and diagnosed with a soft tissue injury with a possible fracture of the third metacarpal base. She was informed that the fracture was not clearly visible on the films and she would need to be seen by a specialist. Accordingly, she made an appointment to see us in the office today. She has stayed neurovascularly intact since the time of the injury.    Pain Location:  LEFT wrist  Radiation: none  Quality: dull, aching  Intensity/Severity: mild-moderate  Duration: since 07/10/21  Progression of symptoms: no worsening, reports improvement  Onset quality: sudden after tripping over her dog  Timing: intermittent  Aggravating Factors: rotating motion, repetitive motion  Alleviating Factors: NSAIDs, brace  Previous Episodes: none mentioned  Associated Symptoms: pain, swelling  ADLs Affected: eating, grooming/hygiene/toileting/personal care  Previous Treatment: NSAIDs       Objective   Vital Signs:   There were no vitals taken for this visit.    Physical Exam  Physical Exam  Vitals signs and nursing note reviewed.   Constitutional:       Appearance: Normal appearance.   Pulmonary:      Effort: Pulmonary effort is normal.   Skin:     General: Skin is warm and dry.      Capillary Refill: Capillary refill takes less than 2 seconds.   Neurological:      General: No focal deficit present.      Mental Status: He is alert and oriented to person, place, and time. Mental status is at baseline.   Psychiatric:          Mood and Affect: Mood normal.         Behavior: Behavior normal.         Thought Content: Thought content normal.         Judgment: Judgment normal.     Ortho Exam     Left hand: The patient is right-hand-dominant. She has significant swelling and bruising on the dorsal aspect of her hand. Clinical pictures are obtained. There is no clinical deformity. The DRUJ is stable. The distal radius does not show any tenderness. She has exquisite-point tenderness at the base of her 3rd metacarpal. The 2nd and 4th metacarpals are diffusely tender; however, not pinpoint or exquisitely so. The extensor tendon function is well preserved. She is able to make a fist; however, it is slowing and painful. There is no clinical malalignment.      Result Review :   The following data was reviewed by: Bennett Maria MD on 07/13/2021:    no diagnostic testing performed this visit            Procedures           Assessment   Assessment and Plan    Diagnoses and all orders for this visit:    1. Closed nondisplaced fracture of base of third metacarpal bone of left hand, initial encounter (Primary)          Follow Up   · Compression/brace to immobilize the fracture in the form of an EXO brace.  · Rest, ice, compression, and elevation (RICE) therapy  · Stretching and strengthening exercises  · OTC Tylenol 500-1000mg by mouth every 6 hours as needed for pain   · Follow up in 4 week(s)  • Patient was given instructions and counseling regarding her condition or for health maintenance advice. Please see specific information pulled into the AVS if appropriate.   • Elevation to control swelling.   • She already has prescriptions of pain medication.   • She works as a  and we have discussed about her returning to work in a limited fashion. I do not suspect that she needs to be off work at this point and the patient thinks that she can manage her job as well.   • Nonoperative management was discussed with the patient at length. All questions  were answered for the patient and we will see her back to follow up in 4 weeks for a reevaluation and repeat x-rays.    Bennett Maria MD   Date of Encounter: 7/13/2021       Scribed for Bennett Maria MD by Kj Fountain.  07/13/21   17:58 EDT    I have personally performed the services described in this document as scribed by the above individual, and it is both accurate and complete.  Bennett Maria MD  7/14/2021  07:48 EDT

## 2021-08-10 ENCOUNTER — OFFICE VISIT (OUTPATIENT)
Dept: ORTHOPEDIC SURGERY | Facility: CLINIC | Age: 21
End: 2021-08-10

## 2021-08-10 VITALS
HEIGHT: 63 IN | SYSTOLIC BLOOD PRESSURE: 107 MMHG | WEIGHT: 115 LBS | BODY MASS INDEX: 20.38 KG/M2 | DIASTOLIC BLOOD PRESSURE: 60 MMHG | HEART RATE: 68 BPM

## 2021-08-10 DIAGNOSIS — S62.343A CLOSED NONDISPLACED FRACTURE OF BASE OF THIRD METACARPAL BONE OF LEFT HAND, INITIAL ENCOUNTER: Primary | ICD-10-CM

## 2021-08-10 PROCEDURE — 99024 POSTOP FOLLOW-UP VISIT: CPT | Performed by: ORTHOPAEDIC SURGERY

## 2021-08-10 NOTE — PROGRESS NOTES
OTHER POST OP GLOBAL     NAME: Bruna Garcia  ?  : 2000  ?  MRN: [id number]    Chief Complaint   Patient presents with   • Left Wrist - Follow-up     4 week f/u Left Wrist Fx     ?  Date of fracture: Right hand third metacarpal base fracture.    HPI:   Patient returns today for 4 week follow up of right Hand, third metacarpal base fracture. The skin and soft tissue swelling has completely settled down there is no clinical deformity. Patient reports doing well with no unusual complaints. Appears to be progressing appropriately.  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      Ortho Exam:   Right hand: Patient is right-hand dominant.  She is neurovascularly intact.  There is no clinical deformity.  She can make a fist without any difficulty.  She has good range of motion in dorsi and palmar flexion of the wrist.  Skin and soft tissue swelling has gone down consistently as the fracture has progressively healed.      Diagnostic Studies:  xrays obtained today  right Hand X-Ray  Indication: Evaluation of healing of a third metacarpal base fracture.  AP, Lateral views  Findings: Anatomically well aligned fracture with some callus formation noted.  No secondary displacements are noted.  no bony lesion  Soft tissues within normal limits  within normal limits joint spaces  Hardware appropriately positioned not applicable      yes prior studies available for comparison.    X-RAY was ordered and reviewed by Bennett Maria MD      Assessment:  Diagnoses and all orders for this visit:    1. Closed nondisplaced fracture of base of third metacarpal bone of left hand, initial encounter (Primary)  -     XR Wrist 3+ View Left          Plan   • It is okay to wean off the XO brace at this point and replace it with a supportive carpal tunnel splint.  • Okay to return back to work.    • Reinjury precautions discussed with the patient.  • Continue  ice as needed  • Aggressive ROM  • Stretching and strengthening exercises  • Tylenol 500-1000mg by mouth every 6 hours as needed for pain   • Fall precautions  • Follow up in 3 month(s)     Date of encounter: 08/10/2021  Bennett Maria MD                 Answers for HPI/ROS submitted by the patient on 8/10/2021  Please describe your symptoms.: Metacarpal follow up  Have you had these symptoms before?: Yes  How long have you been having these symptoms?: Greater than 2 weeks  What is the primary reason for your visit?: Other

## 2021-08-13 RX ORDER — NORETHINDRONE ACETATE AND ETHINYL ESTRADIOL AND FERROUS FUMARATE 1MG-20(21)
KIT ORAL
Qty: 84 TABLET | Refills: 2 | Status: SHIPPED | OUTPATIENT
Start: 2021-08-13 | End: 2022-06-06 | Stop reason: SDUPTHER

## 2022-06-06 ENCOUNTER — OFFICE VISIT (OUTPATIENT)
Dept: OBSTETRICS AND GYNECOLOGY | Facility: CLINIC | Age: 22
End: 2022-06-06

## 2022-06-06 VITALS
HEIGHT: 63 IN | DIASTOLIC BLOOD PRESSURE: 79 MMHG | HEART RATE: 71 BPM | SYSTOLIC BLOOD PRESSURE: 116 MMHG | BODY MASS INDEX: 21.19 KG/M2 | WEIGHT: 119.6 LBS

## 2022-06-06 DIAGNOSIS — Z01.419 WELL WOMAN EXAM WITH ROUTINE GYNECOLOGICAL EXAM: Primary | ICD-10-CM

## 2022-06-06 PROCEDURE — 99395 PREV VISIT EST AGE 18-39: CPT | Performed by: OBSTETRICS & GYNECOLOGY

## 2022-06-06 RX ORDER — NORETHINDRONE ACETATE AND ETHINYL ESTRADIOL 1MG-20(21)
1 KIT ORAL DAILY
Qty: 84 TABLET | Refills: 4 | Status: SHIPPED | OUTPATIENT
Start: 2022-06-06 | End: 2022-07-14 | Stop reason: SDUPTHER

## 2022-06-06 RX ORDER — NORETHINDRONE ACETATE AND ETHINYL ESTRADIOL AND FERROUS FUMARATE 1MG-20(21)
KIT ORAL
Qty: 84 TABLET | Refills: 2 | OUTPATIENT
Start: 2022-06-06

## 2022-06-06 NOTE — TELEPHONE ENCOUNTER
Call to Bruna. Her last AE was 12/14/2020. It popped up for today at 1:45 pm. She can make it work to see Dr Anton before she goes to her job at [place].

## 2022-06-06 NOTE — PROGRESS NOTES
Chief Complaint   Patient presents with   • Annual Exam     Pt presents today for annual exam.last annual- 2020, last pap- 10/21/2019        Bruna Garcia is a 21 y.o.  who presents for an annual examination     Pap history:  Last pap: Oct 2019 NIL  Prior abnormal paps: no  STDs  Sexually active: yes, has new partner in the last year  History of STDs: no  Contraception:  OCP- Doing well on oral contraceptive pill and would like to continue    Screening for BRCA-   Is patient's family history significant for BRCA risk factors? no    Past Medical History:   Diagnosis Date   • Allergic    • Asthma    • Giant cell tumor     L hip     Past Surgical History:   Procedure Laterality Date   • EXCISION TUMOR HIP / PELVIS Left    • HIP ARTHROSCOPY Left    • HIP SURGERY Left      OB History    Para Term  AB Living   0 0 0 0 0 0   SAB IAB Ectopic Molar Multiple Live Births   0 0 0 0 0 0      Social History     Tobacco Use   • Smoking status: Never Smoker   • Smokeless tobacco: Never Used   Substance Use Topics   • Alcohol use: Yes     Comment: social   • Drug use: Yes     Types: Marijuana     Comment: previously     Family History   Problem Relation Age of Onset   • No Known Problems Sister    • Breast cancer Maternal Grandmother         mid 50's   • Cancer Maternal Grandfather    • No Known Problems Sister    • No Known Problems Sister    • Ovarian cancer Neg Hx    • Uterine cancer Neg Hx    • Colon cancer Neg Hx    • Deep vein thrombosis Neg Hx    • Pulmonary embolism Neg Hx      Current Outpatient Medications on File Prior to Visit   Medication Sig Dispense Refill   • [DISCONTINUED] Junel FE 1/20 1-20 MG-MCG per tablet TAKE 1 TABLET BY MOUTH EVERY DAY 84 tablet 2   • cefdinir (OMNICEF) 300 MG capsule Take 1 capsule by mouth 2 (Two) Times a Day. 20 capsule 0     No current facility-administered medications on file prior to visit.     No Known Allergies     Review of Systems   Constitutional:  "Negative for activity change, appetite change, fatigue, fever and unexpected weight change.   Gastrointestinal: Negative for abdominal pain, nausea and vomiting.   Genitourinary: Positive for vaginal discharge. Negative for menstrual problem, vaginal bleeding and vaginal pain.   Hematological: Does not bruise/bleed easily.   Psychiatric/Behavioral: Negative for agitation.         OBJECTIVE:   Vitals:    06/06/22 1400   BP: 116/79   Pulse: 71   Weight: 54.3 kg (119 lb 9.6 oz)   Height: 160 cm (62.99\")      Physical Exam   Constitutional: She is oriented to person, place, and time. She appears well-developed and well-nourished. No distress.   HENT:   Head: Normocephalic and atraumatic.   Eyes: No scleral icterus.   Neck: No thyromegaly present.   Cardiovascular: Normal rate and regular rhythm. Exam reveals no gallop and no friction rub.   No murmur heard.  Pulmonary/Chest: Effort normal and breath sounds normal. No respiratory distress. She has no wheezes. She has no rales. She exhibits no tenderness. Right breast exhibits no inverted nipple. Left breast exhibits no inverted nipple. No breast swelling, tenderness, discharge or bleeding. Breasts are symmetrical.   Abdominal: Soft. Bowel sounds are normal. She exhibits no distension. There is no abdominal tenderness. There is no guarding.   Genitourinary: Vagina normal and uterus normal. There is no rash, tenderness, lesion, injury or Bartholin's cyst on the right labia. There is no rash, tenderness, lesion, injury or Bartholin's cyst on the left labia. Uterus is not mobile.   Cervix is not parous. Cervix does not exhibit motion tenderness, discharge, friability, lesion, polyp, nabothian cyst, eversion, pinkness or cyanosis. Right adnexum displays no mass, no tenderness and no fullness. Right adnexum is present.Left adnexum displays no mass, no tenderness and no fullness. Left adnexum is present.No vaginal discharge found.   Musculoskeletal: No deformity. "   Neurological: She is alert and oriented to person, place, and time.   Skin: Skin is warm and dry. She is not diaphoretic.   Psychiatric: Her speech is normal and behavior is normal. Judgment and thought content normal.       ASSESSMENT/PLAN:     Annual well woman exam:  Cervical cancer screening:    No cervical dysplasia in past   The patient is due for a pap in 2022.    Screening guidelines discussed with patient  Breast cancer screening:    Clinical breast exam recommended for age 20-39 years every 1-3 years   Mammogram recommended starting age 40    Breast self awareness encouraged  STD Screening   Agrees to swab  Contraception :   Desires to continue oral contraceptive pill and has no contraindication .    Family history    does not demonstrate need for genetics referral   Healthy lifestyle counseling:   return for routine annual checkups    BMI Counseling  Her BMI is classified as BMI 18.5-24.9       Classification: normal weight.        Return in about 1 year (around 6/6/2023), or if symptoms worsen or fail to improve, for Annual physical.

## 2022-06-06 NOTE — TELEPHONE ENCOUNTER
Med refill. AE12/14/2020, made AE 6/6/22 at 1:45 pm today. [facility] pharmacy on file. Thank you

## 2022-06-08 LAB
C TRACH RRNA SPEC QL NAA+PROBE: NEGATIVE
N GONORRHOEA RRNA SPEC QL NAA+PROBE: NEGATIVE
T VAGINALIS RRNA SPEC QL NAA+PROBE: NEGATIVE

## 2022-07-14 ENCOUNTER — TELEPHONE (OUTPATIENT)
Dept: OBSTETRICS AND GYNECOLOGY | Facility: CLINIC | Age: 22
End: 2022-07-14

## 2022-07-14 RX ORDER — NORETHINDRONE ACETATE AND ETHINYL ESTRADIOL 1MG-20(21)
1 KIT ORAL DAILY
Qty: 84 TABLET | Refills: 4 | Status: SHIPPED | OUTPATIENT
Start: 2022-07-14

## 2023-10-09 RX ORDER — NORETHINDRONE ACETATE AND ETHINYL ESTRADIOL AND FERROUS FUMARATE 1MG-20(21)
KIT ORAL
Qty: 84 TABLET | Refills: 0 | Status: SHIPPED | OUTPATIENT
Start: 2023-10-09

## 2023-10-27 ENCOUNTER — OFFICE VISIT (OUTPATIENT)
Dept: OBSTETRICS AND GYNECOLOGY | Facility: CLINIC | Age: 23
End: 2023-10-27
Payer: COMMERCIAL

## 2023-10-27 VITALS
HEIGHT: 63 IN | WEIGHT: 155 LBS | BODY MASS INDEX: 27.46 KG/M2 | DIASTOLIC BLOOD PRESSURE: 77 MMHG | SYSTOLIC BLOOD PRESSURE: 113 MMHG

## 2023-10-27 DIAGNOSIS — R63.5 WEIGHT GAIN: ICD-10-CM

## 2023-10-27 DIAGNOSIS — Z01.419 WELL WOMAN EXAM WITH ROUTINE GYNECOLOGICAL EXAM: Primary | ICD-10-CM

## 2023-10-27 LAB
ALBUMIN SERPL-MCNC: 4.6 G/DL (ref 3.5–5.2)
ALBUMIN/GLOB SERPL: 1.6 G/DL
ALP SERPL-CCNC: 79 U/L (ref 39–117)
ALT SERPL-CCNC: 17 U/L (ref 1–33)
AST SERPL-CCNC: 18 U/L (ref 1–32)
BILIRUB SERPL-MCNC: 0.4 MG/DL (ref 0–1.2)
BUN SERPL-MCNC: 12 MG/DL (ref 6–20)
BUN/CREAT SERPL: 16.4 (ref 7–25)
CALCIUM SERPL-MCNC: 9.9 MG/DL (ref 8.6–10.5)
CHLORIDE SERPL-SCNC: 105 MMOL/L (ref 98–107)
CO2 SERPL-SCNC: 23.5 MMOL/L (ref 22–29)
CREAT SERPL-MCNC: 0.73 MG/DL (ref 0.57–1)
EGFRCR SERPLBLD CKD-EPI 2021: 117.2 ML/MIN/1.73
ERYTHROCYTE [DISTWIDTH] IN BLOOD BY AUTOMATED COUNT: 12.1 % (ref 12.3–15.4)
GLOBULIN SER CALC-MCNC: 2.8 GM/DL
GLUCOSE SERPL-MCNC: 74 MG/DL (ref 65–99)
HCT VFR BLD AUTO: 42.2 % (ref 34–46.6)
HGB BLD-MCNC: 13.8 G/DL (ref 12–15.9)
MCH RBC QN AUTO: 28.8 PG (ref 26.6–33)
MCHC RBC AUTO-ENTMCNC: 32.7 G/DL (ref 31.5–35.7)
MCV RBC AUTO: 88.1 FL (ref 79–97)
PLATELET # BLD AUTO: 372 10*3/MM3 (ref 140–450)
POTASSIUM SERPL-SCNC: 4.5 MMOL/L (ref 3.5–5.2)
PROT SERPL-MCNC: 7.4 G/DL (ref 6–8.5)
RBC # BLD AUTO: 4.79 10*6/MM3 (ref 3.77–5.28)
SODIUM SERPL-SCNC: 139 MMOL/L (ref 136–145)
TSH SERPL DL<=0.005 MIU/L-ACNC: 1.59 UIU/ML (ref 0.27–4.2)
WBC # BLD AUTO: 9.31 10*3/MM3 (ref 3.4–10.8)

## 2023-10-27 RX ORDER — NORETHINDRONE ACETATE AND ETHINYL ESTRADIOL AND FERROUS FUMARATE 1MG-20(21)
1 KIT ORAL DAILY
Qty: 84 TABLET | Refills: 4 | Status: SHIPPED | OUTPATIENT
Start: 2023-10-27

## 2023-10-27 NOTE — PROGRESS NOTES
Chief Complaint   Patient presents with    Gynecologic Exam     AE, Last pap  NIL, Discuss different BC options        Bruna Garcia is a 23 y.o.  who presents for an annual examination     Pap history:  Last pap: 2022 NIL; Oct 2019 NIL  Prior abnormal paps: no  STDs  Sexually active: yes, has new partner in the last year  History of STDs: no  Contraception:  OCP- stopped about 2-3 weeks ago and would like to restart  She is concerned about weight gain.     Screening for BRCA-   Is patient's family history significant for BRCA risk factors? no    Past Medical History:   Diagnosis Date    Allergic     Asthma     Giant cell tumor     L hip     Past Surgical History:   Procedure Laterality Date    EXCISION TUMOR HIP / PELVIS Left     HIP ARTHROSCOPY Left     HIP SURGERY Left      OB History    Para Term  AB Living   0 0 0 0 0 0   SAB IAB Ectopic Molar Multiple Live Births   0 0 0 0 0 0      Social History     Tobacco Use    Smoking status: Never    Smokeless tobacco: Never   Vaping Use    Vaping Use: Never used   Substance Use Topics    Alcohol use: Yes     Comment: social    Drug use: Yes     Types: Marijuana     Comment: previously     Family History   Problem Relation Age of Onset    No Known Problems Sister     Breast cancer Maternal Grandmother         mid 50's    Cancer Maternal Grandfather     No Known Problems Sister     No Known Problems Sister     Ovarian cancer Neg Hx     Uterine cancer Neg Hx     Colon cancer Neg Hx     Deep vein thrombosis Neg Hx     Pulmonary embolism Neg Hx      Current Outpatient Medications on File Prior to Visit   Medication Sig Dispense Refill    cefdinir (OMNICEF) 300 MG capsule Take 1 capsule by mouth 2 (Two) Times a Day. 20 capsule 0    [DISCONTINUED] Junel FE 1/20 1-20 MG-MCG per tablet TAKE 1 TABLET BY MOUTH EVERY DAY (Patient not taking: Reported on 10/27/2023) 84 tablet 0     No current facility-administered medications on file prior to visit.  "    No Known Allergies     Review of Systems      OBJECTIVE:   Vitals:    10/27/23 1011   BP: 113/77   Weight: 70.3 kg (155 lb)   Height: 160 cm (62.99\")      Physical Exam   Constitutional: She is oriented to person, place, and time. She appears well-developed and well-nourished. No distress.   HENT:   Head: Normocephalic and atraumatic.   Eyes: No scleral icterus.   Neck: No thyromegaly present.   Cardiovascular: Normal rate and regular rhythm. Exam reveals no gallop and no friction rub.   No murmur heard.  Pulmonary/Chest: Effort normal and breath sounds normal. No respiratory distress. She has no wheezes. She has no rales. She exhibits no tenderness. Right breast exhibits no inverted nipple. Left breast exhibits no inverted nipple. No breast swelling, tenderness, discharge or bleeding. Breasts are symmetrical.   Abdominal: Soft. Bowel sounds are normal. She exhibits no distension. There is no abdominal tenderness. There is no guarding.   Genitourinary: Vagina normal and uterus normal. There is no rash, tenderness, lesion, injury or Bartholin's cyst on the right labia. There is no rash, tenderness, lesion, injury or Bartholin's cyst on the left labia. Uterus is not mobile.   Cervix is not parous. Cervix does not exhibit motion tenderness, discharge, friability, lesion, polyp, nabothian cyst, eversion, pinkness or cyanosis. Right adnexum displays no mass, no tenderness and no fullness. Right adnexum is present.Left adnexum displays no mass, no tenderness and no fullness. Left adnexum is present.No vaginal discharge found.   Musculoskeletal: No deformity.   Neurological: She is alert and oriented to person, place, and time.   Skin: Skin is warm and dry. She is not diaphoretic.   Psychiatric: Her speech is normal and behavior is normal. Judgment and thought content normal.       ASSESSMENT/PLAN:     Annual well woman exam:  Cervical cancer screening:    No cervical dysplasia in past   The patient is due for a pap in " 2025.    Screening guidelines discussed with patient  Breast cancer screening:    Clinical breast exam recommended for age 20-39 years every 1-3 years   Mammogram recommended starting age 40    Breast self awareness encouraged  STD Screening   Agrees to swab  Contraception :   Desires to continue oral contraceptive pill and has no contraindication .    Family history    does not demonstrate need for genetics referral   Healthy lifestyle counseling:   return for routine annual checkups    Weight gain- will get labs    Return in about 1 year (around 10/27/2024) for Annual physical.

## 2025-05-01 ENCOUNTER — HOSPITAL ENCOUNTER (EMERGENCY)
Facility: HOSPITAL | Age: 25
Discharge: HOME | End: 2025-05-01
Attending: EMERGENCY MEDICINE
Payer: COMMERCIAL

## 2025-05-01 ENCOUNTER — APPOINTMENT (OUTPATIENT)
Dept: CT IMAGING | Facility: HOSPITAL | Age: 25
End: 2025-05-01
Payer: COMMERCIAL

## 2025-05-01 VITALS
RESPIRATION RATE: 16 BRPM | OXYGEN SATURATION: 98 % | HEIGHT: 63 IN | DIASTOLIC BLOOD PRESSURE: 77 MMHG | WEIGHT: 130 LBS | BODY MASS INDEX: 23.04 KG/M2 | TEMPERATURE: 98.2 F | HEART RATE: 68 BPM | SYSTOLIC BLOOD PRESSURE: 118 MMHG

## 2025-05-01 DIAGNOSIS — R56.9 SEIZURE-LIKE ACTIVITY: Primary | ICD-10-CM

## 2025-05-01 LAB
ALBUMIN SERPL-MCNC: 4.2 G/DL (ref 3.5–5.2)
ALBUMIN/GLOB SERPL: 1.6 G/DL
ALP SERPL-CCNC: 107 U/L (ref 39–117)
ALT SERPL W P-5'-P-CCNC: 46 U/L (ref 1–33)
ANION GAP SERPL CALCULATED.3IONS-SCNC: 8.2 MMOL/L (ref 5–15)
AST SERPL-CCNC: 44 U/L (ref 1–32)
B-HCG UR QL: NEGATIVE
BASOPHILS # BLD AUTO: 0.11 10*3/MM3 (ref 0–0.2)
BASOPHILS NFR BLD AUTO: 1.1 % (ref 0–1.5)
BILIRUB SERPL-MCNC: 0.2 MG/DL (ref 0–1.2)
BILIRUB UR QL STRIP: NEGATIVE
BUN SERPL-MCNC: 8 MG/DL (ref 6–20)
BUN/CREAT SERPL: 11.3 (ref 7–25)
CALCIUM SPEC-SCNC: 9.6 MG/DL (ref 8.6–10.5)
CHLORIDE SERPL-SCNC: 101 MMOL/L (ref 98–107)
CLARITY UR: CLEAR
CO2 SERPL-SCNC: 26.8 MMOL/L (ref 22–29)
COLOR UR: YELLOW
CREAT SERPL-MCNC: 0.71 MG/DL (ref 0.57–1)
DEPRECATED RDW RBC AUTO: 55.5 FL (ref 37–54)
EGFRCR SERPLBLD CKD-EPI 2021: 120.4 ML/MIN/1.73
EOSINOPHIL # BLD AUTO: 0.34 10*3/MM3 (ref 0–0.4)
EOSINOPHIL NFR BLD AUTO: 3.3 % (ref 0.3–6.2)
ERYTHROCYTE [DISTWIDTH] IN BLOOD BY AUTOMATED COUNT: 17 % (ref 12.3–15.4)
GLOBULIN UR ELPH-MCNC: 2.7 GM/DL
GLUCOSE SERPL-MCNC: 127 MG/DL (ref 65–99)
GLUCOSE UR STRIP-MCNC: NEGATIVE MG/DL
HCT VFR BLD AUTO: 40.3 % (ref 34–46.6)
HGB BLD-MCNC: 13 G/DL (ref 12–15.9)
HGB UR QL STRIP.AUTO: NEGATIVE
IMM GRANULOCYTES # BLD AUTO: 0.03 10*3/MM3 (ref 0–0.05)
IMM GRANULOCYTES NFR BLD AUTO: 0.3 % (ref 0–0.5)
KETONES UR QL STRIP: ABNORMAL
LEUKOCYTE ESTERASE UR QL STRIP.AUTO: NEGATIVE
LYMPHOCYTES # BLD AUTO: 2.17 10*3/MM3 (ref 0.7–3.1)
LYMPHOCYTES NFR BLD AUTO: 21.4 % (ref 19.6–45.3)
MCH RBC QN AUTO: 29 PG (ref 26.6–33)
MCHC RBC AUTO-ENTMCNC: 32.3 G/DL (ref 31.5–35.7)
MCV RBC AUTO: 89.8 FL (ref 79–97)
MONOCYTES # BLD AUTO: 0.82 10*3/MM3 (ref 0.1–0.9)
MONOCYTES NFR BLD AUTO: 8.1 % (ref 5–12)
NEUTROPHILS NFR BLD AUTO: 6.68 10*3/MM3 (ref 1.7–7)
NEUTROPHILS NFR BLD AUTO: 65.8 % (ref 42.7–76)
NITRITE UR QL STRIP: NEGATIVE
NRBC BLD AUTO-RTO: 0 /100 WBC (ref 0–0.2)
PH UR STRIP.AUTO: 6 [PH] (ref 5–8)
PLATELET # BLD AUTO: 299 10*3/MM3 (ref 140–450)
PMV BLD AUTO: 10.1 FL (ref 6–12)
POTASSIUM SERPL-SCNC: 3.8 MMOL/L (ref 3.5–5.2)
PROT SERPL-MCNC: 6.9 G/DL (ref 6–8.5)
PROT UR QL STRIP: NEGATIVE
RBC # BLD AUTO: 4.49 10*6/MM3 (ref 3.77–5.28)
SODIUM SERPL-SCNC: 136 MMOL/L (ref 136–145)
SP GR UR STRIP: 1.02 (ref 1–1.03)
UROBILINOGEN UR QL STRIP: ABNORMAL
WBC NRBC COR # BLD AUTO: 10.15 10*3/MM3 (ref 3.4–10.8)
WHOLE BLOOD HOLD COAG: NORMAL

## 2025-05-01 PROCEDURE — 36415 COLL VENOUS BLD VENIPUNCTURE: CPT

## 2025-05-01 PROCEDURE — 85025 COMPLETE CBC W/AUTO DIFF WBC: CPT

## 2025-05-01 PROCEDURE — 80053 COMPREHEN METABOLIC PANEL: CPT

## 2025-05-01 PROCEDURE — 70450 CT HEAD/BRAIN W/O DYE: CPT

## 2025-05-01 PROCEDURE — 99284 EMERGENCY DEPT VISIT MOD MDM: CPT

## 2025-05-01 PROCEDURE — 81003 URINALYSIS AUTO W/O SCOPE: CPT

## 2025-05-01 PROCEDURE — 81025 URINE PREGNANCY TEST: CPT

## 2025-05-01 NOTE — DISCHARGE INSTRUCTIONS
Recommend no driving until you can follow-up with PCP or neurology  Increase fluids, rest    Return as needed

## 2025-05-01 NOTE — ED PROVIDER NOTES
"Subjective   History of Present Illness  Chief Complaint: \"seizure\"      HPI: Patient is a 24-year-old female who presents by EMS, EMS reports that the patient was sitting in class when classmates observed tonic-clonic behavior they assisted the patient to the ground the seizure lasted approximately 1 minute.  She has no known history of seizure she denies recent illness or exposure she denies medications on a daily basis.  States that her mother and sister have had seizures in the past but are not on daily medications.  She denies recent injury or trauma.  Currently has a headache 5 out of 10.  EMS reports she was not postictal at their arrival has been A/OX4.     PCP: none on file     History provided by:  Patient and EMS personnel      Review of Systems  See above as noted     Past Medical History:   Diagnosis Date    Allergic     Asthma     Giant cell tumor     L hip       No Known Allergies    Past Surgical History:   Procedure Laterality Date    EXCISION TUMOR HIP / PELVIS Left     HIP ARTHROSCOPY Left     HIP SURGERY Left     US GUIDED FINE NEEDLE ASPIRATION  4/3/2018    US GUIDED FINE NEEDLE ASPIRATION  1/23/2020       Family History   Problem Relation Age of Onset    No Known Problems Sister     Breast cancer Maternal Grandmother         mid 50's    Cancer Maternal Grandfather     No Known Problems Sister     No Known Problems Sister     Ovarian cancer Neg Hx     Uterine cancer Neg Hx     Colon cancer Neg Hx     Deep vein thrombosis Neg Hx     Pulmonary embolism Neg Hx        Social History     Socioeconomic History    Marital status: Single   Tobacco Use    Smoking status: Never    Smokeless tobacco: Never   Vaping Use    Vaping status: Never Used   Substance and Sexual Activity    Alcohol use: Yes     Comment: social    Drug use: Yes     Types: Marijuana     Comment: previously    Sexual activity: Yes     Partners: Male     Birth control/protection: OCP           Objective   Physical Exam  Vitals " "reviewed.   Constitutional:       Appearance: She is not ill-appearing or toxic-appearing.   HENT:      Head: Normocephalic.   Eyes:      Extraocular Movements: Extraocular movements intact.      Pupils: Pupils are equal, round, and reactive to light.   Cardiovascular:      Rate and Rhythm: Normal rate and regular rhythm.      Pulses: Normal pulses.   Pulmonary:      Effort: Pulmonary effort is normal.      Breath sounds: Normal breath sounds.   Abdominal:      General: Bowel sounds are normal.      Palpations: Abdomen is soft.      Tenderness: There is no abdominal tenderness. There is no right CVA tenderness or left CVA tenderness.   Musculoskeletal:         General: Normal range of motion.      Cervical back: Neck supple. No rigidity.   Skin:     General: Skin is warm and dry.      Coloration: Skin is not jaundiced.   Neurological:      General: No focal deficit present.      Mental Status: She is alert and oriented to person, place, and time. Mental status is at baseline.      Motor: No weakness.   Psychiatric:         Mood and Affect: Mood normal.         Behavior: Behavior normal.         Thought Content: Thought content normal.         Judgment: Judgment normal.         Procedures           ED Course      /77   Pulse 68   Temp 98.2 °F (36.8 °C)   Resp 16   Ht 160 cm (63\")   Wt 59 kg (130 lb)   LMP 04/24/2025 (Approximate)   SpO2 98%   BMI 23.03 kg/m²   Labs Reviewed   COMPREHENSIVE METABOLIC PANEL - Abnormal; Notable for the following components:       Result Value    Glucose 127 (*)     ALT (SGPT) 46 (*)     AST (SGOT) 44 (*)     All other components within normal limits    Narrative:     GFR Categories in Chronic Kidney Disease (CKD)              GFR Category          GFR (mL/min/1.73)    Interpretation  G1                    90 or greater        Normal or high (1)  G2                    60-89                Mild decrease (1)  G3a                   45-59                Mild to moderate " decrease  G3b                   30-44                Moderate to severe decrease  G4                    15-29                Severe decrease  G5                    14 or less           Kidney failure    (1)In the absence of evidence of kidney disease, neither GFR category G1 or G2 fulfill the criteria for CKD.    eGFR calculation 2021 CKD-EPI creatinine equation, which does not include race as a factor   URINALYSIS W/ CULTURE IF INDICATED - Abnormal; Notable for the following components:    Ketones, UA Trace (*)     All other components within normal limits    Narrative:     In absence of clinical symptoms, the presence of pyuria, bacteria, and/or nitrites on the urinalysis result does not correlate with infection.  Urine microscopic not indicated.   CBC WITH AUTO DIFFERENTIAL - Abnormal; Notable for the following components:    RDW 17.0 (*)     RDW-SD 55.5 (*)     All other components within normal limits   PREGNANCY, URINE - Normal   CBC AND DIFFERENTIAL    Narrative:     The following orders were created for panel order CBC & Differential.  Procedure                               Abnormality         Status                     ---------                               -----------         ------                     CBC Auto Differential[[id number]]        Abnormal            Final result                 Please view results for these tests on the individual orders.   EXTRA TUBES    Narrative:     The following orders were created for panel order Extra Tubes.  Procedure                               Abnormality         Status                     ---------                               -----------         ------                     Light Blue Top[[id number]]                                   Final result                 Please view results for these tests on the individual orders.   LIGHT BLUE TOP     Medications - No data to display  CT Head Without Contrast  Result Date: 5/1/2025  Impression: Normal noncontrast CT head.  No nidus for the patient's seizure activity is identified. Electronically Signed: Goldie Castle MD  5/1/2025 4:02 PM EDT  Workstation ID: [id number]                                                     Medical Decision Making  Patient presented with above complaints, noted physical exam was obtained she was placed on a cardiac monitor and placed in seizure precautions.  Labs were obtained were essentially unremarkable urinalysis negative for infection, CT of the head was obtained and negative for intracranial hemorrhage or mass effect.  As patient has no history of seizures, differentials considered but noninclusive dehydration, infection, vasovagal, hypoglycemia.  I have advised patient to follow-up with PCP or neurology, return as needed for new or worsening symptoms.  Patient gave verbal understanding, no further questions or complaints time of discharge.    Patient case discussed with Dr. Lozano    Chart review:  6/24/2024 outpatient visit [facility] trochanteric bursitis    Labs: Reviewed and interpreted by myself    Radiology: Imaging reviewed by me and interpreted by radiologist.    Medications Medications - No data to display    Part of this note may be an electronic transcription/translation of spoken language to printed text using the Dragon Dictation System.    Appropriate PPE worn during exam.      Note Disclaimer: At [facility], we believe that sharing information builds trust and better  relationships. You are receiving this note because you recently visited [facility]. It is possible you will see health information before a provider has talked with you about it. This kind of information can be easy to misunderstand. To help you fully understand what it means for your health, we urge you to discuss this note with your provider.      Problems Addressed:  Seizure-like activity: complicated acute illness or injury    Amount and/or Complexity of Data Reviewed  Labs:  ordered. Decision-making details documented in ED Course.  Radiology: ordered and independent interpretation performed. Decision-making details documented in ED Course.        Final diagnoses:   Seizure-like activity       ED Disposition  ED Disposition       ED Disposition   Discharge    Condition   Stable    Comment   --               PATIENT CONNECTION - [facility] [id number]  [phone]  Schedule an appointment as soon as possible for a visit in 1 week  As needed, If symptoms worsen         Medication List      No changes were made to your prescriptions during this visit.            Monica Yee, APRN  05/01/25 1250